# Patient Record
Sex: MALE | Race: WHITE | NOT HISPANIC OR LATINO | URBAN - METROPOLITAN AREA
[De-identification: names, ages, dates, MRNs, and addresses within clinical notes are randomized per-mention and may not be internally consistent; named-entity substitution may affect disease eponyms.]

---

## 2017-01-09 ENCOUNTER — EMERGENCY (EMERGENCY)
Facility: HOSPITAL | Age: 82
LOS: 1 days | Discharge: HOME | End: 2017-01-09
Attending: EMERGENCY MEDICINE | Admitting: EMERGENCY MEDICINE
Payer: MEDICARE

## 2017-01-09 VITALS
OXYGEN SATURATION: 99 % | DIASTOLIC BLOOD PRESSURE: 70 MMHG | RESPIRATION RATE: 18 BRPM | HEART RATE: 100 BPM | TEMPERATURE: 98 F | SYSTOLIC BLOOD PRESSURE: 152 MMHG

## 2017-01-09 PROCEDURE — 70486 CT MAXILLOFACIAL W/O DYE: CPT | Mod: 26

## 2017-01-09 PROCEDURE — 99284 EMERGENCY DEPT VISIT MOD MDM: CPT

## 2017-01-09 PROCEDURE — 70450 CT HEAD/BRAIN W/O DYE: CPT | Mod: 26

## 2017-01-09 NOTE — ED PROVIDER NOTE - OBJECTIVE STATEMENT
MD Riley:  89 yo M, c/o R facial laceration s/p mechanical fall/blunt facial trauma.  Patient was walking down stairs to his house, missed a step, hit R zygoma on bannister, sustaining laceration to this area and R temple.  Patient did not LOC, takes baby asa every other day.  Onset 2hrs ago.  No weakness/numbness, no HA, no blurry vision/double vision, no neck pain. Tdap up to date.   +two facial lacerations:  #1 R temple ~ 2cm, #2 R zygoma with muscle bone nerve exposure.

## 2017-01-09 NOTE — ED PROVIDER NOTE - MEDICAL DECISION MAKING DETAILS
MD Riley:  89 yo M, c/o R facial laceration s/p mechanical fall/blunt facial trauma.  Patient was walking down stairs to his house, missed a step, hit R zygoma on bannister, sustaining laceration to this area and R temple.  Patient did not LOC, takes baby asa every other day.  Onset 2hrs ago.  No weakness/numbness, no HA, no blurry vision/double vision, no neck pain.  VS - mild tachy.  Physical Exam: elderly M, ambulates w/o difficulty, +pleasant demeanor, smiling.  +two facial lacerations:  #1 R temple ~ 2cm, #2 R zygoma with muscle bone nerve exposure.  PERRL, EOMI, neck supple, no midline TTP.  RRR, CTAB, ambulates w/o difficulty, AAOx3.  Impression:  mechanical fall, Facial contusion with complex facial laceration, no LOC.  Given age and extent of facial trauma:  will CT Head/Max/Face.  Plastics to close laceration.

## 2017-01-09 NOTE — ED PROVIDER NOTE - ATTENDING CONTRIBUTION TO CARE
MD Riley:  patient seen and evaluated with the resident.  I was present for key portions of the History & Physical, and I agree with the Impression & Plan.  MD Riley:  89 yo M, c/o R facial laceration s/p mechanical fall/blunt facial trauma.  Patient was walking down stairs to his house, missed a step, hit R zygoma on bannister, sustaining laceration to this area and R temple.  Patient did not LOC, takes baby asa every other day.  Onset 2hrs ago.  No weakness/numbness, no HA, no blurry vision/double vision, no neck pain.  VS - mild tachy.  Physical Exam: elderly M, ambulates w/o difficulty, +pleasant demeanor, smiling.  +two facial lacerations:  #1 R temple ~ 2cm, #2 R zygoma with muscle bone nerve exposure.  PERRL, EOMI, neck supple, no midline TTP.  RRR, CTAB, ambulates w/o difficulty, AAOx3.  Impression:  mechanical fall, Facial contusion with complex facial laceration, no LOC.  Given age and extent of facial trauma:  will CT Head/Max/Face.  Plastics to close laceration.

## 2017-01-09 NOTE — ED PROVIDER NOTE - PROGRESS NOTE DETAILS
DAYO WEISS: Pt seen by plastics dr. archer, will f/u in his office  results of ct scan  discussed with him, he will f/u with his pmd

## 2017-01-09 NOTE — ED ADULT TRIAGE NOTE - CHIEF COMPLAINT QUOTE
Pt states missed a step hitting the side of his head. Pt denies LOC, states only on daily asa. Pt with laceration. Pt denies any headaches at this time or any dizzy now or prior to his injury.

## 2017-01-09 NOTE — ED PROVIDER NOTE - CONSTITUTIONAL, MLM
normal... Well appearing, well nourished, awake, alert, oriented to person, place, time/situation and in no apparent distress.  + two facial lacerations

## 2017-12-07 ENCOUNTER — EMERGENCY (EMERGENCY)
Facility: HOSPITAL | Age: 82
LOS: 1 days | Discharge: HOME | End: 2017-12-07
Attending: EMERGENCY MEDICINE | Admitting: EMERGENCY MEDICINE
Payer: MEDICARE

## 2017-12-07 VITALS
TEMPERATURE: 99 F | SYSTOLIC BLOOD PRESSURE: 175 MMHG | DIASTOLIC BLOOD PRESSURE: 84 MMHG | OXYGEN SATURATION: 95 % | HEART RATE: 75 BPM | RESPIRATION RATE: 20 BRPM

## 2017-12-07 VITALS
DIASTOLIC BLOOD PRESSURE: 70 MMHG | HEART RATE: 75 BPM | RESPIRATION RATE: 19 BRPM | TEMPERATURE: 98 F | OXYGEN SATURATION: 99 % | SYSTOLIC BLOOD PRESSURE: 123 MMHG

## 2017-12-07 DIAGNOSIS — R31.0 GROSS HEMATURIA: ICD-10-CM

## 2017-12-07 LAB
ALBUMIN SERPL ELPH-MCNC: 3.8 G/DL — SIGNIFICANT CHANGE UP (ref 3.3–5)
ALP SERPL-CCNC: 80 U/L — SIGNIFICANT CHANGE UP (ref 40–120)
ALT FLD-CCNC: 12 U/L RC — SIGNIFICANT CHANGE UP (ref 10–45)
ANION GAP SERPL CALC-SCNC: 13 MMOL/L — SIGNIFICANT CHANGE UP (ref 5–17)
APPEARANCE UR: ABNORMAL
AST SERPL-CCNC: 16 U/L — SIGNIFICANT CHANGE UP (ref 10–40)
BASOPHILS # BLD AUTO: 0.1 K/UL — SIGNIFICANT CHANGE UP (ref 0–0.2)
BASOPHILS NFR BLD AUTO: 0.6 % — SIGNIFICANT CHANGE UP (ref 0–2)
BILIRUB SERPL-MCNC: 0.4 MG/DL — SIGNIFICANT CHANGE UP (ref 0.2–1.2)
BILIRUB UR-MCNC: NEGATIVE — SIGNIFICANT CHANGE UP
BUN SERPL-MCNC: 28 MG/DL — HIGH (ref 7–23)
CALCIUM SERPL-MCNC: 8.8 MG/DL — SIGNIFICANT CHANGE UP (ref 8.4–10.5)
CHLORIDE SERPL-SCNC: 97 MMOL/L — SIGNIFICANT CHANGE UP (ref 96–108)
CO2 SERPL-SCNC: 21 MMOL/L — LOW (ref 22–31)
COLOR SPEC: ABNORMAL
COMMENT - URINE: SIGNIFICANT CHANGE UP
CREAT SERPL-MCNC: 1.2 MG/DL — SIGNIFICANT CHANGE UP (ref 0.5–1.3)
DIFF PNL FLD: ABNORMAL
EOSINOPHIL # BLD AUTO: 0.4 K/UL — SIGNIFICANT CHANGE UP (ref 0–0.5)
EOSINOPHIL NFR BLD AUTO: 4 % — SIGNIFICANT CHANGE UP (ref 0–6)
GLUCOSE SERPL-MCNC: 129 MG/DL — HIGH (ref 70–99)
GLUCOSE UR QL: NEGATIVE — SIGNIFICANT CHANGE UP
HCT VFR BLD CALC: 35.1 % — LOW (ref 39–50)
HGB BLD-MCNC: 12.6 G/DL — LOW (ref 13–17)
KETONES UR-MCNC: ABNORMAL
LEUKOCYTE ESTERASE UR-ACNC: ABNORMAL
LYMPHOCYTES # BLD AUTO: 3 K/UL — SIGNIFICANT CHANGE UP (ref 1–3.3)
LYMPHOCYTES # BLD AUTO: 31.4 % — SIGNIFICANT CHANGE UP (ref 13–44)
MCHC RBC-ENTMCNC: 33 PG — SIGNIFICANT CHANGE UP (ref 27–34)
MCHC RBC-ENTMCNC: 35.8 GM/DL — SIGNIFICANT CHANGE UP (ref 32–36)
MCV RBC AUTO: 92.3 FL — SIGNIFICANT CHANGE UP (ref 80–100)
MONOCYTES # BLD AUTO: 0.7 K/UL — SIGNIFICANT CHANGE UP (ref 0–0.9)
MONOCYTES NFR BLD AUTO: 7.6 % — SIGNIFICANT CHANGE UP (ref 2–14)
NEUTROPHILS # BLD AUTO: 5.4 K/UL — SIGNIFICANT CHANGE UP (ref 1.8–7.4)
NEUTROPHILS NFR BLD AUTO: 56.5 % — SIGNIFICANT CHANGE UP (ref 43–77)
NITRITE UR-MCNC: NEGATIVE — SIGNIFICANT CHANGE UP
PH UR: 6.5 — SIGNIFICANT CHANGE UP (ref 5–8)
PLATELET # BLD AUTO: 258 K/UL — SIGNIFICANT CHANGE UP (ref 150–400)
POTASSIUM SERPL-MCNC: 4.6 MMOL/L — SIGNIFICANT CHANGE UP (ref 3.5–5.3)
POTASSIUM SERPL-SCNC: 4.6 MMOL/L — SIGNIFICANT CHANGE UP (ref 3.5–5.3)
PROT SERPL-MCNC: 7.2 G/DL — SIGNIFICANT CHANGE UP (ref 6–8.3)
PROT UR-MCNC: 150 MG/DL
RBC # BLD: 3.8 M/UL — LOW (ref 4.2–5.8)
RBC # FLD: 11.2 % — SIGNIFICANT CHANGE UP (ref 10.3–14.5)
RBC CASTS # UR COMP ASSIST: ABNORMAL /HPF (ref 0–2)
SODIUM SERPL-SCNC: 131 MMOL/L — LOW (ref 135–145)
SP GR SPEC: 1.01 — LOW (ref 1.01–1.02)
UROBILINOGEN FLD QL: NEGATIVE — SIGNIFICANT CHANGE UP
WBC # BLD: 9.6 K/UL — SIGNIFICANT CHANGE UP (ref 3.8–10.5)
WBC # FLD AUTO: 9.6 K/UL — SIGNIFICANT CHANGE UP (ref 3.8–10.5)
WBC UR QL: SIGNIFICANT CHANGE UP /HPF (ref 0–5)

## 2017-12-07 PROCEDURE — 51798 US URINE CAPACITY MEASURE: CPT

## 2017-12-07 PROCEDURE — 99284 EMERGENCY DEPT VISIT MOD MDM: CPT | Mod: 25

## 2017-12-07 PROCEDURE — 51702 INSERT TEMP BLADDER CATH: CPT

## 2017-12-07 PROCEDURE — 87086 URINE CULTURE/COLONY COUNT: CPT

## 2017-12-07 PROCEDURE — 80053 COMPREHEN METABOLIC PANEL: CPT

## 2017-12-07 PROCEDURE — 85027 COMPLETE CBC AUTOMATED: CPT

## 2017-12-07 PROCEDURE — 81001 URINALYSIS AUTO W/SCOPE: CPT

## 2017-12-07 PROCEDURE — 99284 EMERGENCY DEPT VISIT MOD MDM: CPT | Mod: GC

## 2017-12-07 RX ORDER — CIPROFLOXACIN LACTATE 400MG/40ML
500 VIAL (ML) INTRAVENOUS EVERY 12 HOURS
Qty: 0 | Refills: 0 | Status: DISCONTINUED | OUTPATIENT
Start: 2017-12-07 | End: 2017-12-11

## 2017-12-07 RX ORDER — MOXIFLOXACIN HYDROCHLORIDE TABLETS, 400 MG 400 MG/1
1 TABLET, FILM COATED ORAL
Qty: 14 | Refills: 0
Start: 2017-12-07 | End: 2017-12-14

## 2017-12-07 RX ADMIN — Medication 500 MILLIGRAM(S): at 04:10

## 2017-12-07 NOTE — ED PROVIDER NOTE - OBJECTIVE STATEMENT
89yoM with PMH of htn, hypothyroidism, enlarged prostate p/w inability to pass urine. Pt endorsed 3 days of hematuria. Pt on Monday 89yoM with PMH of htn, hypothyroidism, enlarged prostate p/w inability to pass urine. Pt endorsed 3 days of hematuria. Pt on Monday had cystoscopy for hematuria by Dr. Downey, because of hx of bladder polyps 8 years ago. After cystoscopy, bleeding decreased. Pt was unable to pass urine today and had significant discomfort, thus called EMS. Pt was able to urinate in ED and has passed hematuria, lighter than previous. Denies pain with urinating.

## 2017-12-07 NOTE — ED PROVIDER NOTE - NS ED ROS FT
ROS  Gen:  no fevers, no chills, no weight loss  HEENT: no vision changes, no hearing loss, no pharygnitis, no oral lesions  Pulm: no cough, no SOB, no sputum production, no wheezing  Cardiac: no chest pain, no SOB, no orthopnea  GI: no abdominal pain, no N, no V, no diarrhea, no constipation  :  + hematuria, inability to pass urine prior to arriving to ED  Skin: no skin lesions  Neuro:  no headache, no focal weakness, no numbness

## 2017-12-07 NOTE — ED PROVIDER NOTE - PLAN OF CARE
You were seen in the emergency department for inability to pass urine. While you were in the ED, you were able to pass bloody urine. A bedside ultrasound examination of the bladder showed that you still had significant amount of urine retained in the bladder as well as blood clots. The urology team was consulted. They performed bladder irrigation to help clear out the blood clots. Please follow up with Dr. Downey within 24-48 hours of discharge from the emergency department. If you experience inability to urine, pelvic pain, fevers, chills, or worsening of blood loss in your urine, please return to the emergency department for further evaluation.

## 2017-12-07 NOTE — ED ADULT NURSE NOTE - OBJECTIVE STATEMENT
89y male presents to the ER c/o urinary retention. pt is a&o x4 and speaking coherently. pt states he had a cystectomy on Monday from his bladder. Pt states around 11pm last night 12/6 he noticed decreased urinary output, pt states when he woke up around 12 he had the urge to urinate, but was unable to produce urine with straining. pt states in the ER he was able to void a large amount of urine. 300 cc shown with bedside US, pt voided after ultrasound as well. Pt in  NAD. denies urge to urinate. pt appears calm. will reassess.

## 2017-12-07 NOTE — ED PROVIDER NOTE - PROGRESS NOTE DETAILS
Attending Shaji: pt voided approximately 400 cc urine remained. gross blood present. will call urology Attending Shaji: pt seen by urology who irrigated. urine clear, pt to be discharged with molina. recommend starting cipro

## 2017-12-07 NOTE — ED PROVIDER NOTE - MEDICAL DECISION MAKING DETAILS
89yo M p/w inability to pass urine, now passing urine, with persistent hematuria. PVR on bladder scan showed 300cc and clots. Pt feels urge to urinate again.   - will repeat PVR, may require CBI 89yo M p/w inability to pass urine, now passing urine, with persistent hematuria. PVR on bladder scan showed 300cc and clots. Pt feels urge to urinate again.   - will repeat PVR, may require CBI  Attending Shaji: 89 y/o male s/p recent cystoscopy with dr oden presenting with difficulty urinating. pocus shows approximately 350cc after urination with evidence of clot. pt given ivf. d/w urology who irrigated and placed molina. recommend d/c on abx. close follw up with dr oden

## 2017-12-07 NOTE — ED PROVIDER NOTE - CARE PLAN
Principal Discharge DX:	Hematuria, gross  Instructions for follow-up, activity and diet:	You were seen in the emergency department for inability to pass urine. While you were in the ED, you were able to pass bloody urine. A bedside ultrasound examination of the bladder showed that you still had significant amount of urine retained in the bladder as well as blood clots. The urology team was consulted. They performed bladder irrigation to help clear out the blood clots. Please follow up with Dr. Downey within 24-48 hours of discharge from the emergency department. If you experience inability to urine, pelvic pain, fevers, chills, or worsening of blood loss in your urine, please return to the emergency department for further evaluation.

## 2017-12-07 NOTE — CONSULT NOTE ADULT - SUBJECTIVE AND OBJECTIVE BOX
89yoM with PMH of HTN, hypothyroidism, enlarged prostate p/w inability to pass urine. Pt endorsed 3 days of hematuria. Patient on Monday had cystoscopy for hematuria with Dr. Downey, because of history of bladder polyps 8 years ago. After cystoscopy, bleeding decreased. Patient was unable to pass urine and had significant discomfort, thus called EMS. Patient came into the ED with clot retention, patient states he voided clots. Patient denies dysuria, frequency, urgency, abdominal pain.     PAST MEDICAL & SURGICAL HISTORY:  Hypothyroidism  Enlarged prostate  Hypertension  No significant past surgical history      MEDICATIONS  (STANDING):  ciprofloxacin     Tablet 500 milliGRAM(s) Oral every 12 hours    MEDICATIONS  (PRN):      FAMILY HISTORY:  No pertinent family history in first degree relatives      Allergies    No Known Allergies    Intolerances        SOCIAL HISTORY:    REVIEW OF SYSTEMS: Otherwise negative as stated in HPI    Physical Exam  Vital signs  T(C): 36.6 (17 @ 03:52), Max: 37 (17 @ 01:13)  HR: 68 (17 @ 03:52)  BP: 122/87 (17 @ 03:52)  SpO2: 97% (17 @ 03:52)  Wt(kg): --    Output  I&O's Summary    UOP    Gen:  [x] NAD    Pulm:  [x] no resp distress [x] no substernal retractions  	    GI:  [x] Soft [x] ND [x] NT    :  Glans Circumcised [x]Y  []N  Meatus Discharge []Y  [x] N,  Blood []Y [x] N                        	  MSK:  Edema []Y [x]N    LABS:       @ 02:19    WBC 9.6   / Hct 35.1  / SCr 1.20         131<L>  |  97  |  28<H>  ----------------------------<  129<H>  4.6   |  21<L>  |  1.20    Ca    8.8      07 Dec 2017 02:19    TPro  7.2  /  Alb  3.8  /  TBili  0.4  /  DBili  x   /  AST  16  /  ALT  12  /  AlkPhos  80        Urinalysis Basic - ( 07 Dec 2017 02:19 )    Color: Red / Appearance: SL Turbid / S.006 / pH: x  Gluc: x / Ketone: Trace  / Bili: Negative / Urobili: Negative   Blood: x / Protein: 150 mg/dL / Nitrite: Negative   Leuk Esterase: Small / RBC: 25-50 /HPF / WBC 11-25 /HPF   Sq Epi: x / Non Sq Epi: x / Bacteria: x        Urine Cx: Pending

## 2017-12-07 NOTE — CONSULT NOTE ADULT - ASSESSMENT
89yoM with PMH of HTN, hypothyroidism, enlarged prostate presents with inability to pass urine.    Patient irrigated with 16 F molina using 500 cc NS with no clots visualized. 14 coude F molina placed using sterile technique. 400cc translucent red urine drained. Pt tolerated procedure well. Patient will be discharged with molina catheter home.     -DC patient with antibiotics   -DC patient with molina leg bag   -Follow up with Dr. Downey   -Discussed with Dr. Marie

## 2017-12-07 NOTE — CONSULT NOTE ADULT - PROBLEM SELECTOR RECOMMENDATION 9
-Irrigated patient  -Molina placed, DC home with molina catheter   -DC home with antibiotics   -Follow up with Dr. Downey

## 2017-12-08 LAB
CULTURE RESULTS: NO GROWTH — SIGNIFICANT CHANGE UP
SPECIMEN SOURCE: SIGNIFICANT CHANGE UP

## 2017-12-14 ENCOUNTER — APPOINTMENT (OUTPATIENT)
Dept: CT IMAGING | Facility: IMAGING CENTER | Age: 82
End: 2017-12-14
Payer: MEDICARE

## 2017-12-14 ENCOUNTER — OUTPATIENT (OUTPATIENT)
Dept: OUTPATIENT SERVICES | Facility: HOSPITAL | Age: 82
LOS: 1 days | End: 2017-12-14
Payer: MEDICARE

## 2017-12-14 DIAGNOSIS — R31.0 GROSS HEMATURIA: ICD-10-CM

## 2017-12-14 PROCEDURE — 74178 CT ABD&PLV WO CNTR FLWD CNTR: CPT

## 2017-12-14 PROCEDURE — 74178 CT ABD&PLV WO CNTR FLWD CNTR: CPT | Mod: 26

## 2019-01-19 ENCOUNTER — EMERGENCY (EMERGENCY)
Facility: HOSPITAL | Age: 84
LOS: 1 days | Discharge: HOME | End: 2019-01-19
Attending: EMERGENCY MEDICINE | Admitting: EMERGENCY MEDICINE
Payer: MEDICARE

## 2019-01-19 VITALS
HEART RATE: 88 BPM | OXYGEN SATURATION: 100 % | RESPIRATION RATE: 18 BRPM | DIASTOLIC BLOOD PRESSURE: 87 MMHG | TEMPERATURE: 98 F | SYSTOLIC BLOOD PRESSURE: 159 MMHG

## 2019-01-19 VITALS
DIASTOLIC BLOOD PRESSURE: 81 MMHG | RESPIRATION RATE: 20 BRPM | HEART RATE: 100 BPM | SYSTOLIC BLOOD PRESSURE: 190 MMHG | TEMPERATURE: 98 F | WEIGHT: 179.9 LBS | HEIGHT: 66 IN | OXYGEN SATURATION: 99 %

## 2019-01-19 LAB
ALBUMIN SERPL ELPH-MCNC: 3.9 G/DL — SIGNIFICANT CHANGE UP (ref 3.3–5)
ALP SERPL-CCNC: 77 U/L — SIGNIFICANT CHANGE UP (ref 40–120)
ALT FLD-CCNC: 33 U/L — SIGNIFICANT CHANGE UP (ref 10–45)
ANION GAP SERPL CALC-SCNC: 11 MMOL/L — SIGNIFICANT CHANGE UP (ref 5–17)
ANION GAP SERPL CALC-SCNC: 11 MMOL/L — SIGNIFICANT CHANGE UP (ref 5–17)
AST SERPL-CCNC: 43 U/L — HIGH (ref 10–40)
BASOPHILS # BLD AUTO: 0 K/UL — SIGNIFICANT CHANGE UP (ref 0–0.2)
BASOPHILS NFR BLD AUTO: 0.3 % — SIGNIFICANT CHANGE UP (ref 0–2)
BILIRUB SERPL-MCNC: 0.2 MG/DL — SIGNIFICANT CHANGE UP (ref 0.2–1.2)
BUN SERPL-MCNC: 46 MG/DL — HIGH (ref 7–23)
BUN SERPL-MCNC: 50 MG/DL — HIGH (ref 7–23)
CALCIUM SERPL-MCNC: 9 MG/DL — SIGNIFICANT CHANGE UP (ref 8.4–10.5)
CALCIUM SERPL-MCNC: 9.2 MG/DL — SIGNIFICANT CHANGE UP (ref 8.4–10.5)
CHLORIDE SERPL-SCNC: 105 MMOL/L — SIGNIFICANT CHANGE UP (ref 96–108)
CHLORIDE SERPL-SCNC: 107 MMOL/L — SIGNIFICANT CHANGE UP (ref 96–108)
CO2 SERPL-SCNC: 19 MMOL/L — LOW (ref 22–31)
CO2 SERPL-SCNC: 21 MMOL/L — LOW (ref 22–31)
CREAT SERPL-MCNC: 1.28 MG/DL — SIGNIFICANT CHANGE UP (ref 0.5–1.3)
CREAT SERPL-MCNC: 1.35 MG/DL — HIGH (ref 0.5–1.3)
EOSINOPHIL # BLD AUTO: 0.2 K/UL — SIGNIFICANT CHANGE UP (ref 0–0.5)
EOSINOPHIL NFR BLD AUTO: 2.7 % — SIGNIFICANT CHANGE UP (ref 0–6)
GLUCOSE SERPL-MCNC: 145 MG/DL — HIGH (ref 70–99)
GLUCOSE SERPL-MCNC: 176 MG/DL — HIGH (ref 70–99)
HCT VFR BLD CALC: 37 % — LOW (ref 39–50)
HGB BLD-MCNC: 12.7 G/DL — LOW (ref 13–17)
LYMPHOCYTES # BLD AUTO: 2.2 K/UL — SIGNIFICANT CHANGE UP (ref 1–3.3)
LYMPHOCYTES # BLD AUTO: 27.2 % — SIGNIFICANT CHANGE UP (ref 13–44)
MCHC RBC-ENTMCNC: 31 PG — SIGNIFICANT CHANGE UP (ref 27–34)
MCHC RBC-ENTMCNC: 34.3 GM/DL — SIGNIFICANT CHANGE UP (ref 32–36)
MCV RBC AUTO: 90.6 FL — SIGNIFICANT CHANGE UP (ref 80–100)
MONOCYTES # BLD AUTO: 0.8 K/UL — SIGNIFICANT CHANGE UP (ref 0–0.9)
MONOCYTES NFR BLD AUTO: 9.5 % — SIGNIFICANT CHANGE UP (ref 2–14)
NEUTROPHILS # BLD AUTO: 5 K/UL — SIGNIFICANT CHANGE UP (ref 1.8–7.4)
NEUTROPHILS NFR BLD AUTO: 60.2 % — SIGNIFICANT CHANGE UP (ref 43–77)
PLATELET # BLD AUTO: 266 K/UL — SIGNIFICANT CHANGE UP (ref 150–400)
POTASSIUM SERPL-MCNC: 4.8 MMOL/L — SIGNIFICANT CHANGE UP (ref 3.5–5.3)
POTASSIUM SERPL-MCNC: 6.6 MMOL/L — CRITICAL HIGH (ref 3.5–5.3)
POTASSIUM SERPL-SCNC: 4.8 MMOL/L — SIGNIFICANT CHANGE UP (ref 3.5–5.3)
POTASSIUM SERPL-SCNC: 6.6 MMOL/L — CRITICAL HIGH (ref 3.5–5.3)
PROT SERPL-MCNC: 8 G/DL — SIGNIFICANT CHANGE UP (ref 6–8.3)
RBC # BLD: 4.09 M/UL — LOW (ref 4.2–5.8)
RBC # FLD: 11.9 % — SIGNIFICANT CHANGE UP (ref 10.3–14.5)
SODIUM SERPL-SCNC: 135 MMOL/L — SIGNIFICANT CHANGE UP (ref 135–145)
SODIUM SERPL-SCNC: 139 MMOL/L — SIGNIFICANT CHANGE UP (ref 135–145)
WBC # BLD: 8.3 K/UL — SIGNIFICANT CHANGE UP (ref 3.8–10.5)
WBC # FLD AUTO: 8.3 K/UL — SIGNIFICANT CHANGE UP (ref 3.8–10.5)

## 2019-01-19 PROCEDURE — 93010 ELECTROCARDIOGRAM REPORT: CPT

## 2019-01-19 PROCEDURE — 85027 COMPLETE CBC AUTOMATED: CPT

## 2019-01-19 PROCEDURE — 93005 ELECTROCARDIOGRAM TRACING: CPT

## 2019-01-19 PROCEDURE — 36415 COLL VENOUS BLD VENIPUNCTURE: CPT

## 2019-01-19 PROCEDURE — 99283 EMERGENCY DEPT VISIT LOW MDM: CPT

## 2019-01-19 PROCEDURE — 99284 EMERGENCY DEPT VISIT MOD MDM: CPT | Mod: 25

## 2019-01-19 PROCEDURE — 80048 BASIC METABOLIC PNL TOTAL CA: CPT

## 2019-01-19 PROCEDURE — 80053 COMPREHEN METABOLIC PANEL: CPT

## 2019-01-19 NOTE — ED ADULT NURSE NOTE - OBJECTIVE STATEMENT
89 y/o M w/ pmh of HTN, hypothyroidism, enlarge prostates, present to ED for abnormal labs, pt went to PMD for route visit and lab work, pt was called to and told potassium was elevated and to go to ED immediately, on exam has no complaints, pt A&Ox3, breathing spontaneous, unlabored w/o distress on room air, NAD, denies dizziness, chest pain, SOB, hematuria, dysuria, fever, chills, N/V/D, seen and eval by MD, heplock placed, labs drawn and sent, EKG done and given to MD

## 2019-01-19 NOTE — ED ADULT NURSE NOTE - NSIMPLEMENTINTERV_GEN_ALL_ED
Implemented All Fall with Harm Risk Interventions:  [place] to call system. Call bell, personal items and telephone within reach. Instruct patient to call for assistance. Room bathroom lighting operational. Non-slip footwear when patient is off stretcher. Physically safe environment: no spills, clutter or unnecessary equipment. Stretcher in lowest position, wheels locked, appropriate side rails in place. Provide visual cue, wrist band, yellow gown, etc. Monitor gait and stability. Monitor for mental status changes and reorient to person, place, and time. Review medications for side effects contributing to fall risk. Reinforce activity limits and safety measures with patient and family. Provide visual clues: red socks.

## 2019-01-19 NOTE — ED PROVIDER NOTE - PROGRESS NOTE DETAILS
Pt re-evaluated s/p labs. Feels well. Results d/w and given to patient. To d/c with outpatient f/u. - Varun Fowler MD

## 2019-01-19 NOTE — ED PROVIDER NOTE - ATTENDING CONTRIBUTION TO CARE
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 Pt seeing cardiologist, Dr Tran, had routine labwork performed, found to have K of 6.8. Pt denies cp, palpitations, sob, lightheadedness, dizziness, n/v/d, abd pain, or any other complaints. Feels well.    PE: Elderly male in NAD, NCAT, MMM, Trachea midline, Normal conjunctiva, lungs CTAB, S1/S2 RRR, Normal perfusion, 2+ radial pulses bilat, Abdomen Soft, NTND, No rebound/guarding, No LE edema, No deformity of extremities, No rashes,  No focal motor or sensory deficits.     Pt appears well, VS reassuring. No sx, most likely is lab error. To repeat bloodwork. EKG shows RBBB which pt states is known. Re-eval, likely d/c if labs reassuring. - Varun Fowler MD

## 2019-01-19 NOTE — ED PROVIDER NOTE - CARE PLAN
Principal Discharge DX:	Abnormal laboratory test result  Assessment and plan of treatment:	Follow up with your Primary Care Physician within the next 2-3 days  Bring a copy of your test results with you to your appointment  Continue your current medication regimen  Return to the Emergency Room if you experience new or worsening symptoms

## 2019-01-19 NOTE — ED PROVIDER NOTE - OBJECTIVE STATEMENT
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 The lab result was reviewed by his cardiologist Dr Eulogio Tran who upon reviewing the labs instructed him to come to the ED. He has chronic EKG finding of known old right and left BBB. The patient denies chest pain, palpitations, tachycardia or any other symptoms.

## 2019-01-19 NOTE — ED ADULT NURSE NOTE - NS ED NURSE RECORD ANOTHER VITAL SIGN
PRE-SEDATION ASSESSMENT    CONSENT  Consent for procedure and sedation obtained: Yes    MEDICAL HISTORY  Significant medical/surgical history: No  Past Complications with Sedation/Anesthesia: No  Significant Family History: No  Smoking History: No  Alcohol/Drug abuse: No  Possible Pregnancy (LMP): No  Cardiac History: No  Respiratory History: No    PHYSICAL EXAM  History and Physical Reviewed: H&P completed today  Airway Risk History: No previous history  Airway Anatomy : Class I  Heart : Normal  Lungs : Normal  LOC/Mental Status : Normal    OTHER FINDINGS  Reviewed current medications and allergies: No  Pertinent lab/diagnostic test reviewed: No    SEDATION RISK ASSESSMENT  Risk Status ASA: Class I - Normal, healthy patient  Plan for Sedation: Moderate Sedation  Indications for Procedure/Pre-Procedure Diagnosis and Planned Procedure: recurrent oral sores, dysphagia  EKG Monitoring: Yes    NARRATIVE FINDINGS      Yes

## 2019-01-19 NOTE — ED ADULT NURSE REASSESSMENT NOTE - NS ED NURSE REASSESS COMMENT FT1
Patient report received from Gely SOTO. Patient is a/ox3, denies medical complaints. Well appearing, awaiting repeat labs at this time. VSS, afebrile.

## 2019-05-25 ENCOUNTER — EMERGENCY (EMERGENCY)
Facility: HOSPITAL | Age: 84
LOS: 1 days | Discharge: HOME | End: 2019-05-25
Attending: EMERGENCY MEDICINE
Payer: MEDICARE

## 2019-05-25 VITALS
TEMPERATURE: 98 F | DIASTOLIC BLOOD PRESSURE: 72 MMHG | HEART RATE: 75 BPM | OXYGEN SATURATION: 98 % | HEIGHT: 66 IN | RESPIRATION RATE: 16 BRPM | SYSTOLIC BLOOD PRESSURE: 156 MMHG | WEIGHT: 177.91 LBS

## 2019-05-25 PROCEDURE — 73020 X-RAY EXAM OF SHOULDER: CPT

## 2019-05-25 PROCEDURE — 76882 US LMTD JT/FCL EVL NVASC XTR: CPT

## 2019-05-25 PROCEDURE — 99284 EMERGENCY DEPT VISIT MOD MDM: CPT | Mod: 25

## 2019-05-25 PROCEDURE — 99284 EMERGENCY DEPT VISIT MOD MDM: CPT

## 2019-05-25 PROCEDURE — 73030 X-RAY EXAM OF SHOULDER: CPT | Mod: 26,RT

## 2019-05-25 PROCEDURE — 76882 US LMTD JT/FCL EVL NVASC XTR: CPT | Mod: 26

## 2019-05-25 PROCEDURE — 73030 X-RAY EXAM OF SHOULDER: CPT

## 2019-05-25 NOTE — ED PROVIDER NOTE - CARE PLAN
Principal Discharge DX:	Shoulder pain, right  Assessment and plan of treatment:	Apply ice 20mins on, 40mins off in cycle for first 24-48 hours. Then apply heat.  Keep shoulder immobilizer in place for comfort until follow up with orthopedic.   Take Tylenol 650mg every 6-8 hours as needed for pain.   Follow up with your Primary Care Provider upon discharge and Orthopedic at upcoming appointment for further evaluation and treatment.   Return to ED for worsening pain, fever, weakness, numbness or any other concerning symptoms.

## 2019-05-25 NOTE — ED ADULT NURSE NOTE - NSIMPLEMENTINTERV_GEN_ALL_ED
Implemented All Fall with Harm Risk Interventions:  [place] to call system. Call bell, personal items and telephone within reach. Instruct patient to call for assistance. Room bathroom lighting operational. Non-slip footwear when patient is off stretcher. Physically safe environment: no spills, clutter or unnecessary equipment. Stretcher in lowest position, wheels locked, appropriate side rails in place. Provide visual cue, wrist band, yellow gown, etc. Monitor gait and stability. Monitor for mental status changes and reorient to person, place, and time. Review medications for side effects contributing to fall risk. Reinforce activity limits and safety measures with patient and family. Provide visual clues: red socks.

## 2019-05-25 NOTE — ED PROVIDER NOTE - PHYSICAL EXAMINATION
Attending Lou Girard: gen; nad, heent; atraumatic, eomi, perrla, cv: rrr, lungs: ctab, abd: soft, nontender, nondistneded. ext: rightshoulder edema +effusion, no deformty, neuro: awake and alert following commands

## 2019-05-25 NOTE — ED PROVIDER NOTE - UPPER EXTREMITY EXAM, RIGHT
+ mild ttp at lateral deltoid, ROM intact + pain only with abduction; full elbow/wrist/hand ROM intact with strength 5/5; radial pulse 2+; sensation intact

## 2019-05-25 NOTE — ED PROVIDER NOTE - NSFOLLOWUPCLINICS_GEN_ALL_ED_FT
[facility] Orthopedic [facility]  Orthopedic Surgery  [address], [address]rd [address]th [place] [address]  Phone: [phone]  Fax:   Follow Up Time: 1-3 Days

## 2019-05-25 NOTE — ED PROVIDER NOTE - CLINICAL SUMMARY MEDICAL DECISION MAKING FREE TEXT BOX
Attending Lou Girard: 89 y/o male presenting with right shoulder pain. no deformity on exam or neurovascular compromise. xray shows no evidence of deformity. suspect likely ligamentous injury. pt has orthopedic follow up. did not want tylenol. plan to d/c

## 2019-05-25 NOTE — ED ADULT NURSE NOTE - OBJECTIVE STATEMENT
Pt is a 89 yo M who came to the ED amb c/o rt shoulder injury. States he was reaching for something and felt a "pop" in rt shoulder this morning. A/O x3, + swelling rt shoulder, no obvious deformity, + pulses, dec ROM due to injury.

## 2019-05-25 NOTE — ED PROVIDER NOTE - PLAN OF CARE
Apply ice 20mins on, 40mins off in cycle for first 24-48 hours. Then apply heat.  Keep shoulder immobilizer in place for comfort until follow up with orthopedic.   Take Tylenol 650mg every 6-8 hours as needed for pain.   Follow up with your Primary Care Provider upon discharge and Orthopedic at upcoming appointment for further evaluation and treatment.   Return to ED for worsening pain, fever, weakness, numbness or any other concerning symptoms.

## 2019-05-25 NOTE — ED PROVIDER NOTE - OBJECTIVE STATEMENT
89yo M presenting with R shoulder pain since last night. Pt reports he felt a pop while reaching up into a cabinet and has since had R shoulder pain with movement. No pain at rest. Does not want anything for pain at this time. No fall/trauma. No previous orthopedic surgeries on neck/RUE. Denies CP, SOB, neck pain, R elbow/wrist/hand pain, numbness/tingling. 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 Pt reports he felt a pop while reaching up into a cabinet and has since had R shoulder pain with movement. No pain at rest. Does not want anything for pain at this time. No fall/trauma. No previous orthopedic surgeries on neck/RUE. Denies CP, SOB, neck pain, R elbow/wrist/hand pain, numbness/tingling.

## 2019-05-25 NOTE — ED PROVIDER NOTE - ATTENDING CONTRIBUTION TO CARE
Attending MD Lou Girard:   I personally have seen and examined this patient.  Physician assistant note reviewed and agree on plan of care and except where noted.  See HPI, PE, and MDM for details.

## 2019-05-29 ENCOUNTER — APPOINTMENT (OUTPATIENT)
Dept: ORTHOPEDIC SURGERY | Facility: CLINIC | Age: 84
End: 2019-05-29
Payer: MEDICARE

## 2019-05-29 VITALS — HEART RATE: 71 BPM | DIASTOLIC BLOOD PRESSURE: 68 MMHG | SYSTOLIC BLOOD PRESSURE: 137 MMHG

## 2019-05-29 VITALS — HEIGHT: 66 IN | BODY MASS INDEX: 28.93 KG/M2 | WEIGHT: 180 LBS

## 2019-05-29 PROCEDURE — 99203 OFFICE O/P NEW LOW 30 MIN: CPT

## 2019-05-29 RX ORDER — CHROMIUM 200 MCG
TABLET ORAL
Refills: 0 | Status: ACTIVE | COMMUNITY

## 2019-07-03 ENCOUNTER — APPOINTMENT (OUTPATIENT)
Dept: ORTHOPEDIC SURGERY | Facility: CLINIC | Age: 84
End: 2019-07-03

## 2019-07-10 ENCOUNTER — APPOINTMENT (OUTPATIENT)
Dept: ORTHOPEDIC SURGERY | Facility: CLINIC | Age: 84
End: 2019-07-10
Payer: MEDICARE

## 2019-07-10 VITALS
SYSTOLIC BLOOD PRESSURE: 136 MMHG | HEIGHT: 66 IN | WEIGHT: 174 LBS | HEART RATE: 63 BPM | BODY MASS INDEX: 27.97 KG/M2 | DIASTOLIC BLOOD PRESSURE: 64 MMHG

## 2019-07-10 DIAGNOSIS — M19.211 SECONDARY OSTEOARTHRITIS, RIGHT SHOULDER: ICD-10-CM

## 2019-07-10 DIAGNOSIS — M75.122 COMPLETE ROTATOR CUFF TEAR OR RUPTURE OF LEFT SHOULDER, NOT SPECIFIED AS TRAUMATIC: ICD-10-CM

## 2019-07-10 DIAGNOSIS — M75.121 COMPLETE ROTATOR CUFF TEAR OR RUPTURE OF RIGHT SHOULDER, NOT SPECIFIED AS TRAUMATIC: ICD-10-CM

## 2019-07-10 PROCEDURE — 99212 OFFICE O/P EST SF 10 MIN: CPT

## 2019-07-16 PROBLEM — D03.9 MELANOMA IN SITU: Status: ACTIVE | Noted: 2019-07-16

## 2019-07-17 ENCOUNTER — APPOINTMENT (OUTPATIENT)
Dept: SURGICAL ONCOLOGY | Facility: CLINIC | Age: 84
End: 2019-07-17
Payer: MEDICARE

## 2019-07-17 VITALS
RESPIRATION RATE: 16 BRPM | HEART RATE: 60 BPM | DIASTOLIC BLOOD PRESSURE: 73 MMHG | WEIGHT: 172 LBS | SYSTOLIC BLOOD PRESSURE: 173 MMHG | HEIGHT: 66 IN | OXYGEN SATURATION: 96 % | BODY MASS INDEX: 27.64 KG/M2 | TEMPERATURE: 97.8 F

## 2019-07-17 DIAGNOSIS — D03.9 MELANOMA IN SITU, UNSPECIFIED: ICD-10-CM

## 2019-07-17 PROCEDURE — 99204 OFFICE O/P NEW MOD 45 MIN: CPT

## 2019-07-24 ENCOUNTER — OUTPATIENT (OUTPATIENT)
Dept: OUTPATIENT SERVICES | Facility: HOSPITAL | Age: 84
LOS: 1 days | End: 2019-07-24
Payer: MEDICARE

## 2019-07-24 ENCOUNTER — EMERGENCY (EMERGENCY)
Facility: HOSPITAL | Age: 84
LOS: 1 days | Discharge: HOME | End: 2019-07-24
Attending: EMERGENCY MEDICINE
Payer: MEDICARE

## 2019-07-24 ENCOUNTER — RESULT REVIEW (OUTPATIENT)
Age: 84
End: 2019-07-24

## 2019-07-24 VITALS
DIASTOLIC BLOOD PRESSURE: 80 MMHG | SYSTOLIC BLOOD PRESSURE: 164 MMHG | TEMPERATURE: 98 F | OXYGEN SATURATION: 97 % | HEART RATE: 77 BPM | RESPIRATION RATE: 18 BRPM

## 2019-07-24 VITALS
DIASTOLIC BLOOD PRESSURE: 66 MMHG | TEMPERATURE: 98 F | RESPIRATION RATE: 18 BRPM | OXYGEN SATURATION: 97 % | HEIGHT: 66 IN | SYSTOLIC BLOOD PRESSURE: 135 MMHG | HEART RATE: 61 BPM | WEIGHT: 171.96 LBS

## 2019-07-24 DIAGNOSIS — D03.4 MELANOMA IN SITU OF SCALP AND NECK: ICD-10-CM

## 2019-07-24 LAB
ANION GAP SERPL CALC-SCNC: 13 MMOL/L — SIGNIFICANT CHANGE UP (ref 5–17)
BUN SERPL-MCNC: 36 MG/DL — HIGH (ref 7–23)
CALCIUM SERPL-MCNC: 9.3 MG/DL — SIGNIFICANT CHANGE UP (ref 8.4–10.5)
CHLORIDE SERPL-SCNC: 106 MMOL/L — SIGNIFICANT CHANGE UP (ref 96–108)
CO2 SERPL-SCNC: 20 MMOL/L — LOW (ref 22–31)
CREAT SERPL-MCNC: 1.32 MG/DL — HIGH (ref 0.5–1.3)
GLUCOSE SERPL-MCNC: 133 MG/DL — HIGH (ref 70–99)
HCT VFR BLD CALC: 38.5 % — LOW (ref 39–50)
HGB BLD-MCNC: 12.7 G/DL — LOW (ref 13–17)
INR BLD: 1 RATIO — SIGNIFICANT CHANGE UP (ref 0.88–1.16)
MCHC RBC-ENTMCNC: 30.6 PG — SIGNIFICANT CHANGE UP (ref 27–34)
MCHC RBC-ENTMCNC: 33 GM/DL — SIGNIFICANT CHANGE UP (ref 32–36)
MCV RBC AUTO: 92.8 FL — SIGNIFICANT CHANGE UP (ref 80–100)
PLATELET # BLD AUTO: 246 K/UL — SIGNIFICANT CHANGE UP (ref 150–400)
POTASSIUM SERPL-MCNC: 4.9 MMOL/L — SIGNIFICANT CHANGE UP (ref 3.5–5.3)
POTASSIUM SERPL-MCNC: 5.8 MMOL/L — HIGH (ref 3.5–5.3)
POTASSIUM SERPL-SCNC: 4.9 MMOL/L — SIGNIFICANT CHANGE UP (ref 3.5–5.3)
POTASSIUM SERPL-SCNC: 5.8 MMOL/L — HIGH (ref 3.5–5.3)
PROTHROM AB SERPL-ACNC: 11.5 SEC — SIGNIFICANT CHANGE UP (ref 10–12.9)
RBC # BLD: 4.15 M/UL — LOW (ref 4.2–5.8)
RBC # FLD: 11.5 % — SIGNIFICANT CHANGE UP (ref 10.3–14.5)
SODIUM SERPL-SCNC: 139 MMOL/L — SIGNIFICANT CHANGE UP (ref 135–145)
WBC # BLD: 9.1 K/UL — SIGNIFICANT CHANGE UP (ref 3.8–10.5)
WBC # FLD AUTO: 9.1 K/UL — SIGNIFICANT CHANGE UP (ref 3.8–10.5)

## 2019-07-24 PROCEDURE — 99284 EMERGENCY DEPT VISIT MOD MDM: CPT

## 2019-07-24 PROCEDURE — 74177 CT ABD & PELVIS W/CONTRAST: CPT

## 2019-07-24 PROCEDURE — 71260 CT THORAX DX C+: CPT

## 2019-07-24 PROCEDURE — 84132 ASSAY OF SERUM POTASSIUM: CPT

## 2019-07-24 PROCEDURE — 88321 CONSLTJ&REPRT SLD PREP ELSWR: CPT

## 2019-07-24 PROCEDURE — 99284 EMERGENCY DEPT VISIT MOD MDM: CPT | Mod: GC

## 2019-07-24 PROCEDURE — 80048 BASIC METABOLIC PNL TOTAL CA: CPT

## 2019-07-24 PROCEDURE — 85027 COMPLETE CBC AUTOMATED: CPT

## 2019-07-24 PROCEDURE — 85610 PROTHROMBIN TIME: CPT

## 2019-07-24 PROCEDURE — 71260 CT THORAX DX C+: CPT | Mod: 26

## 2019-07-24 RX ORDER — SODIUM CHLORIDE 9 MG/ML
500 INJECTION INTRAMUSCULAR; INTRAVENOUS; SUBCUTANEOUS ONCE
Refills: 0 | Status: COMPLETED | OUTPATIENT
Start: 2019-07-24 | End: 2019-07-24

## 2019-07-24 RX ORDER — SODIUM CHLORIDE 9 MG/ML
500 INJECTION, SOLUTION INTRAVENOUS
Refills: 0 | Status: DISCONTINUED | OUTPATIENT
Start: 2019-07-24 | End: 2019-07-24

## 2019-07-24 RX ORDER — ACETAMINOPHEN 500 MG
650 TABLET ORAL ONCE
Refills: 0 | Status: COMPLETED | OUTPATIENT
Start: 2019-07-24 | End: 2019-07-24

## 2019-07-24 RX ADMIN — SODIUM CHLORIDE 500 MILLILITER(S): 9 INJECTION INTRAMUSCULAR; INTRAVENOUS; SUBCUTANEOUS at 17:02

## 2019-07-24 NOTE — ED ADULT NURSE REASSESSMENT NOTE - NS ED NURSE REASSESS COMMENT FT1
Que HARDING call radiology for preliminary CT read. Pt aware results only preliminary and not final, preliminary read clear. Pt discharged home to follow up with PMD. VSS. Pt ambulate to discharge area with steady gait.

## 2019-07-24 NOTE — ED PROVIDER NOTE - CARDIAC, MLM
Normal rate, regular rhythm.  Heart sounds S1, S2.  No murmurs, rubs or gallops. With palpation of the curve of the 12th rib on the left side, patient with marked point tenderness. He also has some LUQ pain.

## 2019-07-24 NOTE — ED PROVIDER NOTE - PLAN OF CARE
Patient presenting with Patient presenting with left rib pain after fall.     Left chest wall pain  - CT chest, abd, pelvis pending.  - Pain is well controlled. Patient presenting with left rib pain after fall.     Left chest wall pain  - CT chest, abd, pelvis without any acute changes or signs of fracture.   - Pain is well controlled. Patient presenting with left rib pain after fall.     Left chest wall pain  - CT chest, abd, pelvis without any acute changes or signs of fracture per wet read with Radiology resident. Patient is very eager for discharge and unwilling to stay for formal, final read with attending radiologist. We discussed the risks and benefits of staying for formal read, and patient still wished to leave. He is discharged home with instructions to return if his pain worsens or he has any shortness of breath.  - Pain is well controlled.

## 2019-07-24 NOTE — ED PROVIDER NOTE - CARE PLAN
Assessment and plan of treatment:	Patient presenting with Assessment and plan of treatment:	Patient presenting with left rib pain after fall.     Left chest wall pain  - CT chest, abd, pelvis pending.  - Pain is well controlled. Assessment and plan of treatment:	Patient presenting with left rib pain after fall.     Left chest wall pain  - CT chest, abd, pelvis without any acute changes or signs of fracture.   - Pain is well controlled. Assessment and plan of treatment:	Patient presenting with left rib pain after fall.     Left chest wall pain  - CT chest, abd, pelvis without any acute changes or signs of fracture per wet read with Radiology resident. Patient is very eager for discharge and unwilling to stay for formal, final read with attending radiologist. We discussed the risks and benefits of staying for formal read, and patient still wished to leave. He is discharged home with instructions to return if his pain worsens or he has any shortness of breath.  - Pain is well controlled. Principal Discharge DX:	Chest wall contusion, left, initial encounter  Assessment and plan of treatment:	Patient presenting with left rib pain after fall.     Left chest wall pain  - CT chest, abd, pelvis without any acute changes or signs of fracture per wet read with Radiology resident. Patient is very eager for discharge and unwilling to stay for formal, final read with attending radiologist. We discussed the risks and benefits of staying for formal read, and patient still wished to leave. He is discharged home with instructions to return if his pain worsens or he has any shortness of breath.  - Pain is well controlled.

## 2019-07-24 NOTE — ED PROVIDER NOTE - PROGRESS NOTE DETAILS
CTChest  Abdomen and Pelvis without signs of fracture. PCKD of the right kidney noted. Reviewed study with Radiology who do not appreciate any obvious acute changes on this imaging study. Pain is well-controlled.  Patient eager for discharge home.

## 2019-07-24 NOTE — ED ADULT NURSE NOTE - NSIMPLEMENTINTERV_GEN_ALL_ED
Implemented All Fall with Harm Risk Interventions:  [place] to call system. Call bell, personal items and telephone within reach. Instruct patient to call for assistance. Room bathroom lighting operational. Non-slip footwear when patient is off stretcher. Physically safe environment: no spills, clutter or unnecessary equipment. Stretcher in lowest position, wheels locked, appropriate side rails in place. Provide visual cue, wrist band, yellow gown, etc. Monitor gait and stability. Monitor for mental status changes and reorient to person, place, and time. Review medications for side effects contributing to fall risk. Reinforce activity limits and safety measures with patient and family. Provide visual clues: red socks.

## 2019-07-24 NOTE — ED ADULT NURSE NOTE - CHIEF COMPLAINT QUOTE
Left rib pain, Had echocardiogram today at [facility] doctor's office, missed the step and fell on his left side. Denies LOC, shortness of breathe, not on blood thinner.

## 2019-07-24 NOTE — ED ADULT TRIAGE NOTE - CHIEF COMPLAINT QUOTE
Left rib pain, Had echocardiogram today at [facility] doctor's office, missed the step and fell on his left side. Denies LOC, shortness of breathe, not on blood thinner.

## 2019-07-24 NOTE — ED PROVIDER NOTE - OBJECTIVE STATEMENT
Patient is a 89 year old with history of HTN, BPH, and hypothyroidism who presents with left sided chest pain. Patient states that he was at his Cardiologist's office this morning. He fell after an echocardiogram and struck the left side of his chest against a box. He had sharp pain in the left chest wall following this. Fearful that he had a rib fracture, he came to the ED for further evaluation.

## 2019-07-24 NOTE — ED PROVIDER NOTE - NSFOLLOWUPINSTRUCTIONS_ED_ALL_ED_FT
- Please follow-up with PCP regarding recent hospital stay.   - Please return to ED if you have any worsening pain or trouble breathing.

## 2019-07-24 NOTE — ED PROVIDER NOTE - CLINICAL SUMMARY MEDICAL DECISION MAKING FREE TEXT BOX
Patient with fall at doctor's office. CT scan showed no acute fracture or abnormalities. Mechanical fall c L chest/LUQ tenderness to palpation in elderly man, not on AC.  VSS, nontoxic, well appearing though concern for intra-thoracic/abdominal trauma necessitates CT C/A/P.  Labs, pain Rx, reassess.  --BMM

## 2019-07-25 LAB — SURGICAL PATHOLOGY STUDY: SIGNIFICANT CHANGE UP

## 2019-07-25 PROCEDURE — 74177 CT ABD & PELVIS W/CONTRAST: CPT | Mod: 26

## 2019-08-16 ENCOUNTER — OUTPATIENT (OUTPATIENT)
Dept: OUTPATIENT SERVICES | Facility: HOSPITAL | Age: 84
LOS: 1 days | End: 2019-08-16

## 2019-08-16 VITALS
DIASTOLIC BLOOD PRESSURE: 74 MMHG | RESPIRATION RATE: 14 BRPM | OXYGEN SATURATION: 98 % | HEIGHT: 62 IN | HEART RATE: 60 BPM | WEIGHT: 173.94 LBS | SYSTOLIC BLOOD PRESSURE: 130 MMHG | TEMPERATURE: 96 F

## 2019-08-16 DIAGNOSIS — Z96.651 PRESENCE OF RIGHT ARTIFICIAL KNEE JOINT: Chronic | ICD-10-CM

## 2019-08-16 DIAGNOSIS — E03.9 HYPOTHYROIDISM, UNSPECIFIED: ICD-10-CM

## 2019-08-16 DIAGNOSIS — D03.9 MELANOMA IN SITU, UNSPECIFIED: ICD-10-CM

## 2019-08-16 DIAGNOSIS — R06.83 SNORING: ICD-10-CM

## 2019-08-16 DIAGNOSIS — I10 ESSENTIAL (PRIMARY) HYPERTENSION: ICD-10-CM

## 2019-08-16 DIAGNOSIS — Z98.890 OTHER SPECIFIED POSTPROCEDURAL STATES: Chronic | ICD-10-CM

## 2019-08-16 LAB
ANION GAP SERPL CALC-SCNC: 11 MMO/L — SIGNIFICANT CHANGE UP (ref 7–14)
BLD GP AB SCN SERPL QL: NEGATIVE — SIGNIFICANT CHANGE UP
BUN SERPL-MCNC: 36 MG/DL — HIGH (ref 7–23)
CALCIUM SERPL-MCNC: 9 MG/DL — SIGNIFICANT CHANGE UP (ref 8.4–10.5)
CHLORIDE SERPL-SCNC: 106 MMOL/L — SIGNIFICANT CHANGE UP (ref 98–107)
CO2 SERPL-SCNC: 23 MMOL/L — SIGNIFICANT CHANGE UP (ref 22–31)
CREAT SERPL-MCNC: 1.4 MG/DL — HIGH (ref 0.5–1.3)
GLUCOSE SERPL-MCNC: 116 MG/DL — HIGH (ref 70–99)
HCT VFR BLD CALC: 34.4 % — LOW (ref 39–50)
HGB BLD-MCNC: 10.9 G/DL — LOW (ref 13–17)
MCHC RBC-ENTMCNC: 30.1 PG — SIGNIFICANT CHANGE UP (ref 27–34)
MCHC RBC-ENTMCNC: 31.7 % — LOW (ref 32–36)
MCV RBC AUTO: 95 FL — SIGNIFICANT CHANGE UP (ref 80–100)
NRBC # FLD: 0 K/UL — SIGNIFICANT CHANGE UP (ref 0–0)
PLATELET # BLD AUTO: 225 K/UL — SIGNIFICANT CHANGE UP (ref 150–400)
PMV BLD: 10.3 FL — SIGNIFICANT CHANGE UP (ref 7–13)
POTASSIUM SERPL-MCNC: 4.4 MMOL/L — SIGNIFICANT CHANGE UP (ref 3.5–5.3)
POTASSIUM SERPL-SCNC: 4.4 MMOL/L — SIGNIFICANT CHANGE UP (ref 3.5–5.3)
RBC # BLD: 3.62 M/UL — LOW (ref 4.2–5.8)
RBC # FLD: 12.7 % — SIGNIFICANT CHANGE UP (ref 10.3–14.5)
RH IG SCN BLD-IMP: POSITIVE — SIGNIFICANT CHANGE UP
SODIUM SERPL-SCNC: 140 MMOL/L — SIGNIFICANT CHANGE UP (ref 135–145)
WBC # BLD: 8.34 K/UL — SIGNIFICANT CHANGE UP (ref 3.8–10.5)
WBC # FLD AUTO: 8.34 K/UL — SIGNIFICANT CHANGE UP (ref 3.8–10.5)

## 2019-08-16 NOTE — H&P PST ADULT - NEGATIVE ALLERGY TYPES
no reactions to food/no reactions to animals/no reactions to medicines/no outdoor environmental allergies/no indoor environmental allergies/no reactions to insect bites

## 2019-08-16 NOTE — H&P PST ADULT - RS GEN PE MLT RESP DETAILS PC
no rales/breath sounds equal/airway patent/good air movement/clear to auscultation bilaterally/no rhonchi/respirations non-labored/no wheezes

## 2019-08-16 NOTE — H&P PST ADULT - NEGATIVE GENERAL GENITOURINARY SYMPTOMS
no dysuria/no urine discoloration/no hematuria/no bladder infections/no flank pain L/no incontinence/no flank pain R/no urinary hesitancy/normal urinary frequency

## 2019-08-16 NOTE — H&P PST ADULT - NSICDXPASTMEDICALHX_GEN_ALL_CORE_FT
PAST MEDICAL HISTORY:  Enlarged prostate     Hypertension     Hypothyroidism     Melanoma in situ, unspecified

## 2019-08-16 NOTE — H&P PST ADULT - HISTORY OF PRESENT ILLNESS
Patient is a 89 year old male presents to Presurgical Testing for an evaluation for a scheduled Wide excision left scalp melanoma, wide excision left clavicle melanoma scheduled on 8/27/2019 with Dr. Cason.  Pre op diagnosis: Melanoma in situ, unspecified. Patient reports the Scalp and Left clavicle Melanoma was discovered on a routine Dermatology evaluation in July 2019, Asymptomatic.

## 2019-08-16 NOTE — H&P PST ADULT - SKIN COMMENTS
Pre op diagnosis: Melanoma in situ, unspecified, Left clavicle "Melanoma" noted  - approximately 1cm, and Left scalp " melanoma " noted approximately 5cm

## 2019-08-16 NOTE — H&P PST ADULT - NEGATIVE MUSCULOSKELETAL SYMPTOMS
no joint swelling/no stiffness/no arm pain R/no leg pain L/no back pain/no myalgia/no muscle weakness/no neck pain/no leg pain R/no muscle cramps/no arm pain L

## 2019-08-16 NOTE — H&P PST ADULT - ATTENDING COMMENTS
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 George.    His diagnosis was reviewed with him in my office, and again on the morning of operation.    All questions answered, consent on chart

## 2019-08-16 NOTE — H&P PST ADULT - NEGATIVE ENMT SYMPTOMS
no nasal obstruction/no dysphagia/no sinus symptoms/no vertigo/no nasal congestion/no post-nasal discharge/no hearing difficulty/no gum bleeding/no dry mouth/no nose bleeds/no recurrent cold sores/no ear pain/no tinnitus/no nasal discharge/no throat pain

## 2019-08-16 NOTE — H&P PST ADULT - NEGATIVE BREAST SYMPTOMS
no nipple discharge R/no breast tenderness L/no nipple discharge L/no breast lump R/no breast lump L/no breast tenderness R

## 2019-08-16 NOTE — H&P PST ADULT - NEGATIVE GASTROINTESTINAL SYMPTOMS
no abdominal pain/no constipation/no change in bowel habits/no nausea/no diarrhea/no vomiting/no melena

## 2019-08-16 NOTE — H&P PST ADULT - ASSESSMENT
Patient is scheduled for Wide excision left scalp melanoma, wide excision left clavicle melanoma scheduled on 8/27/2019 with Dr. Cason.  Pre op diagnosis: Melanoma in situ, unspecified.

## 2019-08-16 NOTE — H&P PST ADULT - NEGATIVE OPHTHALMOLOGIC SYMPTOMS
no discharge R/no pain L/no diplopia/no photophobia/no pain R/no irritation L/no blurred vision R/no irritation R/no blurred vision L/no discharge L

## 2019-08-16 NOTE — H&P PST ADULT - NSICDXPROBLEM_GEN_ALL_CORE_FT
PROBLEM DIAGNOSES  Problem: Melanoma in situ, unspecified  Assessment and Plan: Patient is scheduled for Wide excision left scalp melanoma, wide excision left clavicle melanoma scheduled on 8/27/2019 with Dr. Cason.  Preop instructions, pepcid, surgical scrub provided. Pt stated understanding. Teach back method utilized.   Pending Cardiology evaluation per surgeon and obtain for PST- ( Patient's PMD is a cardiologist. Patient reports was evaluated by Cardiologist on August 9th, 2019.       Problem: Snoring  Assessment and Plan: EDDIE precautions, OR booking notified.      Problem: Hypothyroidism  Assessment and Plan: Patient instructed to take Levothyroxine in the AM of surgery with a sip of water.    Problem: Hypertension  Assessment and Plan: Patient instructed to take Enalapril in the AM of surgery with a sip of water.

## 2019-08-20 PROBLEM — D03.9 MELANOMA IN SITU, UNSPECIFIED: Chronic | Status: ACTIVE | Noted: 2019-08-16

## 2019-08-26 ENCOUNTER — TRANSCRIPTION ENCOUNTER (OUTPATIENT)
Age: 84
End: 2019-08-26

## 2019-08-26 RX ORDER — SODIUM CHLORIDE 9 MG/ML
1000 INJECTION INTRAMUSCULAR; INTRAVENOUS; SUBCUTANEOUS
Refills: 0 | Status: DISCONTINUED | OUTPATIENT
Start: 2019-08-27 | End: 2019-09-11

## 2019-08-27 ENCOUNTER — OUTPATIENT (OUTPATIENT)
Dept: OUTPATIENT SERVICES | Facility: HOSPITAL | Age: 84
LOS: 1 days | Discharge: HOME | End: 2019-08-27
Payer: MEDICARE

## 2019-08-27 ENCOUNTER — RESULT REVIEW (OUTPATIENT)
Age: 84
End: 2019-08-27

## 2019-08-27 ENCOUNTER — APPOINTMENT (OUTPATIENT)
Dept: SURGICAL ONCOLOGY | Facility: HOSPITAL | Age: 84
End: 2019-08-27

## 2019-08-27 VITALS
HEART RATE: 60 BPM | WEIGHT: 173.94 LBS | TEMPERATURE: 98 F | OXYGEN SATURATION: 98 % | SYSTOLIC BLOOD PRESSURE: 153 MMHG | RESPIRATION RATE: 17 BRPM | HEIGHT: 62 IN | DIASTOLIC BLOOD PRESSURE: 64 MMHG

## 2019-08-27 VITALS
OXYGEN SATURATION: 98 % | SYSTOLIC BLOOD PRESSURE: 139 MMHG | HEART RATE: 70 BPM | RESPIRATION RATE: 15 BRPM | DIASTOLIC BLOOD PRESSURE: 76 MMHG

## 2019-08-27 DIAGNOSIS — Z96.651 PRESENCE OF RIGHT ARTIFICIAL KNEE JOINT: Chronic | ICD-10-CM

## 2019-08-27 DIAGNOSIS — Z98.890 OTHER SPECIFIED POSTPROCEDURAL STATES: Chronic | ICD-10-CM

## 2019-08-27 DIAGNOSIS — D03.9 MELANOMA IN SITU, UNSPECIFIED: ICD-10-CM

## 2019-08-27 PROCEDURE — 88305 TISSUE EXAM BY PATHOLOGIST: CPT | Mod: 26

## 2019-08-27 PROCEDURE — 11626 EXC S/N/H/F/G MAL+MRG >4 CM: CPT

## 2019-08-27 PROCEDURE — 11604 EXC TR-EXT MAL+MARG 3.1-4 CM: CPT

## 2019-08-27 RX ADMIN — SODIUM CHLORIDE 30 MILLILITER(S): 9 INJECTION INTRAMUSCULAR; INTRAVENOUS; SUBCUTANEOUS at 07:20

## 2019-08-27 NOTE — BRIEF OPERATIVE NOTE - OPERATION/FINDINGS
#1. Large diameter melanoma in situ of the scalp.  #2. Melanoma in situ of left clavicle.    Both areas excised with a minimum 5 mm margin, and immediate reconstruction.

## 2019-08-27 NOTE — ASU DISCHARGE PLAN (ADULT/PEDIATRIC) - CALL YOUR DOCTOR IF YOU HAVE ANY OF THE FOLLOWING:
Bleeding that does not stop/Wound/Surgical Site with redness, or foul smelling discharge or pus/Numbness, tingling, color or temperature change to extremity/Swelling that gets worse/Pain not relieved by Medications/Excessive diarrhea/Increased irritability or sluggishness/Nausea and vomiting that does not stop/Unable to urinate/Inability to tolerate liquids or foods

## 2019-08-27 NOTE — ASU DISCHARGE PLAN (ADULT/PEDIATRIC) - ASU DC SPECIAL INSTRUCTIONSFT
Sleep with head elevated on one or 2 pillows.    Initial followup with plastic surgery in the next 5-15 days.    Dr. Cason should call with pathology report in approximately 10 days, that conversation will determine further management

## 2019-08-27 NOTE — ASU PREOP CHECKLIST - SITE MARKED BY SURGEON
[facility]    [address]    [facility] [address]    Phone:  [phone]       Thank You for choosing us for your health care visit. We are glad to serve you and happy to provide you with this summary of your visit. Please help us to ensure we have accurate records. If you find anything that needs to be changed, please let our staff know as soon as possible.          Your Demographic Information     Patient Name Sex     Nathan Shepherd Male 1960       Ethnic Group Patient Race    Not of  or  Origin White      Your Visit Details     Date & Time Provider Department    2017 8:00 AM Markie Mistry MD [facility]      Your Upcoming Appointment*(Max 10)     2017 11:00 AM CST   Behavioral Health Extended Follow-Up with Bandar Tucker MD   [facility] [place] ([facility])    [address]   [place] [address]   [phone]            Monday April 10, 2017  7:00 AM CDT   Office Visit with Josue Arevalo MD   [facility] Internal Med ([facility])    [address]   [place] [address]   [phone]              Your To Do List     Future Orders Please Complete On or Around Expires    BASIC METABOLIC PANEL  2017 Mar 22, 2017    URIC ACID  2017 Mar 22, 2017    XR ANKLE 3+ VW LEFT  2017 May 21, 2017      We Ordered or Performed the Following     SERVICE TO ENDOCRINOLOGY     SERVICE TO NEPHROLOGY       Conditions Discussed Today or Order-Related Diagnoses        Comments    Acute left ankle pain    -  Primary     CRF (chronic renal failure), stage 4 (severe)         Uncontrolled type 2 diabetes mellitus with stage 4 chronic kidney disease, without long-term current use of insulin           Your Vitals Were     BP Pulse Temp Resp Height Weight    122/70 102 98.3 °F (36.8 °C) (Oral) 18 5' 10\" (1.778 m) 300 lb  (136.1 kg)    SpO2 BMI Smoking Status             97% 43.05 kg/m2 Former Smoker         Medications Prescribed or Re-Ordered Today     predniSONE (DELTASONE) 20 MG tablet    Sig - Route: Take 2 tablets by mouth daily for 5 days. - Oral    Class: Eprescribe    Pharmacy: [id number] [place] [address] [place]  Ph #: [phone]      Your Current Medications Are        Disp Refills Start End    NIFEdipine (ADALAT CC) 90 MG 24 hr tablet 90 tablet 0 1/18/2017     Sig: Take 1 tablet by mouth  daily    Class: Eprescribe    atorvastatin (LIPITOR) 80 MG tablet 90 tablet 1 1/18/2017     Sig: Take 1 tablet by mouth  daily    Class: Eprescribe    metolazone (ZAROXOLYN) 2.5 MG tablet 36 tablet 0 1/13/2017     Sig: TAKE 1 TABLET BY MOUTH  EVERY MONDAY, WEDNESDAY,  AND FRIDAY    Class: Eprescribe    tamsulosin (FLOMAX) 0.4 MG Cap 90 capsule 0 1/11/2017     Sig - Route: Take 1 capsule by mouth daily. - Oral    Class: Eprescribe    Cosign for Ordering: Accepted by Josue Arevalo MD on 1/11/2017  5:10 PM    hydrALAZINE (APRESOLINE) 100 MG tablet 270 tablet 1 1/3/2017     Sig - Route: Take 1 tablet by mouth 3 times daily. - Oral    Class: Eprescribe    Notes to Pharmacy: called [facility] [place]    potassium chloride (KLOR-CON M10) 10 MEQ CR tablet 1080 tablet 0 12/28/2016     Sig: TAKE FOUR TABLETS BY MOUTH THREE TIMES DAILY    Class: Eprescribe    furosemide (LASIX) 80 MG tablet 180 tablet 0 10/24/2016     Sig - Route: Take 1 tablet by mouth 2 times daily. Called to [facility] [place] [phone] - Oral    Class: Script Not Printed    cloNIDine (CATAPRES) 0.2 MG tablet 180 tablet 0 10/24/2016     Sig: TAKE ONE TABLET BY MOUTH TWICE DAILY.    Class: Script Not Printed    Notes to Pharmacy: called [facility] [place] [phone]    sertraline (ZOLOFT) 100 MG tablet 135 tablet 0 10/24/2016     Sig: TAKE ONE AND ONE-HALF TABLETS BY MOUTH ONCE DAILY called to [facility]  [phone] [facility] [place]    Class: Script  Not Printed    nalTREXone (REVIA) 50 MG tablet 90 tablet 3 3/8/2016     Sig - Route: Take 1 tablet by mouth daily. - Oral    Class: Eprescribe    hydrocortisone (CORTIZONE) 2.5 % lotion 59 mL 11 3/18/2015     Sig: Apply once a day to scaly rash of face and ears p.r.n. scaling    Class: Eprescribe    Cosign for Ordering: Accepted by Juliocesar Anderson MD on 3/18/2015 10:29 AM    Omega-3 Fatty Acids (FISH OIL) 1000 MG capsule        Sig - Route: Take 2 g by mouth 2 times daily. - Oral    Class: Historical Med    nitroGLYcerin (NITROSTAT) 0.4 MG SL tablet        Sig - Route: Place 0.4 mg under the tongue every 5 minutes as needed. Take for chest pain. - Sublingual    Class: Historical Med    Multiple Vitamin (MULTI-VITAMIN PO)        Sig - Route: Take 1 tablet by mouth daily. - Oral    Class: Historical Med    predniSONE (DELTASONE) 20 MG tablet 10 tablet 0 2/20/2017 2/25/2017    Sig - Route: Take 2 tablets by mouth daily for 5 days. - Oral    Class: Eprescribe      Allergies     Niacin Other (See Comments)    FLUSHING AND BURNING SENSATION      Immunizations History as of 2/20/2017     Name Date    Influenza 10/27/2014, 1/9/2012, 9/28/2010    Influenza Quadrivalent Preservative Free 10/10/2016    Pneumococcal Polysaccharide Adult 9/28/2010    Td:Adult type tetanus/diphtheria 1/9/2012    Tdap 1/9/2012      Problem List as of 2/20/2017     Hyperlipidemia    Hypertension    Anxiety and depression    Erectile dysfunction    BPH (benign prostatic hypertrophy)    Neuropathy    Obstructive sleep apnea syndrome    Orchitis and epididymitis, unspecified    CAD, CABG in 1997, D-1 stent 5/2011, Stents in RCA and VG to PDA 7/2011    Basal cell carcinoma of cheek    Hematoma of kidney    DMII (diabetes mellitus, type 2)    CKD (chronic kidney disease) stage 4, GFR 15-29 ml/min    Anemia              Patient Instructions    SUGGEST IMMEDIATE SCHEDULING FOR RENAL SVC AS WELL AS ENDOCRINOLOGY.     SUGGEST EST WITH DR. SHA RICHTER IN  INTERNAL MEDICINE.         yes

## 2019-09-05 LAB — SURGICAL PATHOLOGY STUDY: SIGNIFICANT CHANGE UP

## 2019-09-05 NOTE — REASON FOR VISIT
[Initial Consultation] : an initial consultation for [Other: _____] : [unfilled] [FreeTextEntry2] : Melanoma in situ scalp, and left clavicle

## 2019-09-05 NOTE — HISTORY OF PRESENT ILLNESS
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 Large diameter (~5 cm) at least melanoma in situ of the SCALP.\par #2. 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 Eulogio MULLER.\par \par He does not have a pacemaker or defibrillator.\par He takes no anticoagulants.\par He takes a baby aspirin weekly.\par Hypertension treated with enalapril, And Toprol.\par Simvastatin for hyperlipidemia.\par Levothyroxine for hypothyroidism, He does not see an endocrinologist.\par \par Most recent eye examination was 2017.\par \par Colonoscopy at age 89 was normal

## 2019-09-05 NOTE — PHYSICAL EXAM
[Normal] : supple, no neck mass and thyroid not enlarged [Normal Neck Lymph Nodes] : normal neck lymph nodes  [Normal Supraclavicular Lymph Nodes] : normal supraclavicular lymph nodes [Normal Axillary Lymph Nodes] : normal axillary lymph nodes [Normal] : full range of motion and no deformities appreciated [de-identified] : Groins not examined [de-identified] : Below

## 2019-09-05 NOTE — REVIEW OF SYSTEMS
[Negative] : Heme/Lymph [FreeTextEntry5] : Hypertension [de-identified] : Torn rotator cuff [de-identified] : Melanoma [de-identified] : Hypothyroid

## 2019-09-05 NOTE — ASSESSMENT
[FreeTextEntry1] : Large diameter melanoma in situ of scalp, and left clavicle, both of which should be excised with appropriate margins and reconstruction.\par \par Reviewed in detail, all questions answered.\par \par He is in agreement.\par Will have chest x-ray with presurgical testing.\par Papers for surgical scheduling submitted\par \par (09-05-19.\par We spoke.\par August 27, 2019: \par Wide excision of large diameter melanoma in situ from the top of the left side of the scalp, AND a similar lesion at the left clavicle, with negative margins, and reconstructions by Dr. Gary George\par Patient is understandably pleased with the results.\par No invasive component noted.\par My office will call to schedule a postoperative visit)\par \par \par \par Note dictated

## 2019-10-29 NOTE — ED ADULT NURSE NOTE - NS ED NURSE DC INFO COMPLEXITY

## 2020-12-09 ENCOUNTER — INPATIENT (INPATIENT)
Facility: HOSPITAL | Age: 85
LOS: 2 days | Discharge: HOME | DRG: 291 | End: 2020-12-12
Attending: INTERNAL MEDICINE | Admitting: INTERNAL MEDICINE
Payer: MEDICARE

## 2020-12-09 VITALS
HEART RATE: 70 BPM | DIASTOLIC BLOOD PRESSURE: 76 MMHG | TEMPERATURE: 98 F | RESPIRATION RATE: 20 BRPM | SYSTOLIC BLOOD PRESSURE: 137 MMHG | OXYGEN SATURATION: 94 % | HEIGHT: 66 IN | WEIGHT: 179.9 LBS

## 2020-12-09 DIAGNOSIS — J90 PLEURAL EFFUSION, NOT ELSEWHERE CLASSIFIED: ICD-10-CM

## 2020-12-09 DIAGNOSIS — Z96.651 PRESENCE OF RIGHT ARTIFICIAL KNEE JOINT: Chronic | ICD-10-CM

## 2020-12-09 DIAGNOSIS — Z98.890 OTHER SPECIFIED POSTPROCEDURAL STATES: Chronic | ICD-10-CM

## 2020-12-09 LAB
ALBUMIN SERPL ELPH-MCNC: 3.8 G/DL — SIGNIFICANT CHANGE UP (ref 3.3–5)
ALP SERPL-CCNC: 92 U/L — SIGNIFICANT CHANGE UP (ref 40–120)
ALT FLD-CCNC: 37 U/L — SIGNIFICANT CHANGE UP (ref 10–45)
ANION GAP SERPL CALC-SCNC: 10 MMOL/L — SIGNIFICANT CHANGE UP (ref 5–17)
APTT BLD: 28.8 SEC — SIGNIFICANT CHANGE UP (ref 27.5–35.5)
AST SERPL-CCNC: 24 U/L — SIGNIFICANT CHANGE UP (ref 10–40)
BASOPHILS # BLD AUTO: 0.05 K/UL — SIGNIFICANT CHANGE UP (ref 0–0.2)
BASOPHILS NFR BLD AUTO: 0.6 % — SIGNIFICANT CHANGE UP (ref 0–2)
BILIRUB SERPL-MCNC: 0.4 MG/DL — SIGNIFICANT CHANGE UP (ref 0.2–1.2)
BUN SERPL-MCNC: 34 MG/DL — HIGH (ref 7–23)
CALCIUM SERPL-MCNC: 9.1 MG/DL — SIGNIFICANT CHANGE UP (ref 8.4–10.5)
CHLORIDE SERPL-SCNC: 108 MMOL/L — SIGNIFICANT CHANGE UP (ref 96–108)
CK MB CFR SERPL CALC: 6 NG/ML — SIGNIFICANT CHANGE UP (ref 0–6.7)
CK SERPL-CCNC: 99 U/L — SIGNIFICANT CHANGE UP (ref 30–200)
CO2 SERPL-SCNC: 23 MMOL/L — SIGNIFICANT CHANGE UP (ref 22–31)
CREAT SERPL-MCNC: 1.42 MG/DL — HIGH (ref 0.5–1.3)
D DIMER BLD IA.RAPID-MCNC: 320 NG/ML DDU — HIGH
EOSINOPHIL # BLD AUTO: 0.08 K/UL — SIGNIFICANT CHANGE UP (ref 0–0.5)
EOSINOPHIL NFR BLD AUTO: 1 % — SIGNIFICANT CHANGE UP (ref 0–6)
GLUCOSE SERPL-MCNC: 118 MG/DL — HIGH (ref 70–99)
HCT VFR BLD CALC: 37.5 % — LOW (ref 39–50)
HGB BLD-MCNC: 12 G/DL — LOW (ref 13–17)
IMM GRANULOCYTES NFR BLD AUTO: 0.3 % — SIGNIFICANT CHANGE UP (ref 0–1.5)
INR BLD: 1.07 RATIO — SIGNIFICANT CHANGE UP (ref 0.88–1.16)
LYMPHOCYTES # BLD AUTO: 1.69 K/UL — SIGNIFICANT CHANGE UP (ref 1–3.3)
LYMPHOCYTES # BLD AUTO: 21.3 % — SIGNIFICANT CHANGE UP (ref 13–44)
MCHC RBC-ENTMCNC: 30.7 PG — SIGNIFICANT CHANGE UP (ref 27–34)
MCHC RBC-ENTMCNC: 32 GM/DL — SIGNIFICANT CHANGE UP (ref 32–36)
MCV RBC AUTO: 95.9 FL — SIGNIFICANT CHANGE UP (ref 80–100)
MONOCYTES # BLD AUTO: 0.64 K/UL — SIGNIFICANT CHANGE UP (ref 0–0.9)
MONOCYTES NFR BLD AUTO: 8.1 % — SIGNIFICANT CHANGE UP (ref 2–14)
NEUTROPHILS # BLD AUTO: 5.44 K/UL — SIGNIFICANT CHANGE UP (ref 1.8–7.4)
NEUTROPHILS NFR BLD AUTO: 68.7 % — SIGNIFICANT CHANGE UP (ref 43–77)
NRBC # BLD: 0 /100 WBCS — SIGNIFICANT CHANGE UP (ref 0–0)
NT-PROBNP SERPL-SCNC: HIGH PG/ML (ref 0–300)
PLATELET # BLD AUTO: 217 K/UL — SIGNIFICANT CHANGE UP (ref 150–400)
POTASSIUM SERPL-MCNC: 5.5 MMOL/L — HIGH (ref 3.5–5.3)
POTASSIUM SERPL-SCNC: 5.5 MMOL/L — HIGH (ref 3.5–5.3)
PROT SERPL-MCNC: 6.8 G/DL — SIGNIFICANT CHANGE UP (ref 6–8.3)
PROTHROM AB SERPL-ACNC: 12.8 SEC — SIGNIFICANT CHANGE UP (ref 10.6–13.6)
RBC # BLD: 3.91 M/UL — LOW (ref 4.2–5.8)
RBC # FLD: 13 % — SIGNIFICANT CHANGE UP (ref 10.3–14.5)
SARS-COV-2 RNA SPEC QL NAA+PROBE: SIGNIFICANT CHANGE UP
SODIUM SERPL-SCNC: 141 MMOL/L — SIGNIFICANT CHANGE UP (ref 135–145)
TROPONIN T, HIGH SENSITIVITY RESULT: 47 NG/L — SIGNIFICANT CHANGE UP (ref 0–51)
TROPONIN T, HIGH SENSITIVITY RESULT: 52 NG/L — HIGH (ref 0–51)
WBC # BLD: 7.92 K/UL — SIGNIFICANT CHANGE UP (ref 3.8–10.5)
WBC # FLD AUTO: 7.92 K/UL — SIGNIFICANT CHANGE UP (ref 3.8–10.5)

## 2020-12-09 PROCEDURE — 99285 EMERGENCY DEPT VISIT HI MDM: CPT

## 2020-12-09 PROCEDURE — 71045 X-RAY EXAM CHEST 1 VIEW: CPT | Mod: 26

## 2020-12-09 PROCEDURE — 71275 CT ANGIOGRAPHY CHEST: CPT | Mod: 26

## 2020-12-09 RX ORDER — SIMVASTATIN 20 MG/1
10 TABLET, FILM COATED ORAL AT BEDTIME
Refills: 0 | Status: DISCONTINUED | OUTPATIENT
Start: 2020-12-09 | End: 2020-12-12

## 2020-12-09 RX ORDER — SIMVASTATIN 20 MG/1
1 TABLET, FILM COATED ORAL
Qty: 0 | Refills: 0 | DISCHARGE

## 2020-12-09 RX ORDER — FUROSEMIDE 40 MG
40 TABLET ORAL DAILY
Refills: 0 | Status: DISCONTINUED | OUTPATIENT
Start: 2020-12-10 | End: 2020-12-11

## 2020-12-09 RX ORDER — TAMSULOSIN HYDROCHLORIDE 0.4 MG/1
2 CAPSULE ORAL
Qty: 0 | Refills: 0 | DISCHARGE

## 2020-12-09 RX ORDER — FUROSEMIDE 40 MG
20 TABLET ORAL ONCE
Refills: 0 | Status: COMPLETED | OUTPATIENT
Start: 2020-12-09 | End: 2020-12-09

## 2020-12-09 RX ORDER — FAMOTIDINE 10 MG/ML
20 INJECTION INTRAVENOUS
Refills: 0 | Status: DISCONTINUED | OUTPATIENT
Start: 2020-12-09 | End: 2020-12-12

## 2020-12-09 RX ORDER — FOLIC ACID 0.8 MG
1 TABLET ORAL
Qty: 0 | Refills: 0 | DISCHARGE

## 2020-12-09 RX ORDER — FAMOTIDINE 10 MG/ML
1 INJECTION INTRAVENOUS
Qty: 0 | Refills: 0 | DISCHARGE

## 2020-12-09 RX ORDER — ASPIRIN/CALCIUM CARB/MAGNESIUM 324 MG
325 TABLET ORAL ONCE
Refills: 0 | Status: COMPLETED | OUTPATIENT
Start: 2020-12-09 | End: 2020-12-09

## 2020-12-09 RX ORDER — TAMSULOSIN HYDROCHLORIDE 0.4 MG/1
0.8 CAPSULE ORAL AT BEDTIME
Refills: 0 | Status: DISCONTINUED | OUTPATIENT
Start: 2020-12-09 | End: 2020-12-12

## 2020-12-09 RX ORDER — FINASTERIDE 5 MG/1
5 TABLET, FILM COATED ORAL DAILY
Refills: 0 | Status: DISCONTINUED | OUTPATIENT
Start: 2020-12-09 | End: 2020-12-12

## 2020-12-09 RX ORDER — LEVOTHYROXINE SODIUM 125 MCG
150 TABLET ORAL DAILY
Refills: 0 | Status: DISCONTINUED | OUTPATIENT
Start: 2020-12-09 | End: 2020-12-12

## 2020-12-09 RX ORDER — ASPIRIN/CALCIUM CARB/MAGNESIUM 324 MG
81 TABLET ORAL DAILY
Refills: 0 | Status: DISCONTINUED | OUTPATIENT
Start: 2020-12-10 | End: 2020-12-12

## 2020-12-09 RX ORDER — ASPIRIN/CALCIUM CARB/MAGNESIUM 324 MG
1 TABLET ORAL
Qty: 0 | Refills: 0 | DISCHARGE

## 2020-12-09 RX ORDER — HEPARIN SODIUM 5000 [USP'U]/ML
5000 INJECTION INTRAVENOUS; SUBCUTANEOUS EVERY 8 HOURS
Refills: 0 | Status: DISCONTINUED | OUTPATIENT
Start: 2020-12-09 | End: 2020-12-12

## 2020-12-09 RX ORDER — FOLIC ACID 0.8 MG
1 TABLET ORAL DAILY
Refills: 0 | Status: DISCONTINUED | OUTPATIENT
Start: 2020-12-09 | End: 2020-12-12

## 2020-12-09 RX ORDER — CHOLECALCIFEROL (VITAMIN D3) 125 MCG
2000 CAPSULE ORAL DAILY
Refills: 0 | Status: DISCONTINUED | OUTPATIENT
Start: 2020-12-09 | End: 2020-12-12

## 2020-12-09 RX ORDER — CHOLECALCIFEROL (VITAMIN D3) 125 MCG
1 CAPSULE ORAL
Qty: 0 | Refills: 0 | DISCHARGE

## 2020-12-09 RX ORDER — FINASTERIDE 5 MG/1
1 TABLET, FILM COATED ORAL
Qty: 0 | Refills: 0 | DISCHARGE

## 2020-12-09 RX ADMIN — SIMVASTATIN 10 MILLIGRAM(S): 20 TABLET, FILM COATED ORAL at 21:31

## 2020-12-09 RX ADMIN — Medication 325 MILLIGRAM(S): at 16:04

## 2020-12-09 RX ADMIN — Medication 20 MILLIGRAM(S): at 17:58

## 2020-12-09 RX ADMIN — FAMOTIDINE 20 MILLIGRAM(S): 10 INJECTION INTRAVENOUS at 21:31

## 2020-12-09 RX ADMIN — Medication 20 MILLIGRAM(S): at 16:54

## 2020-12-09 RX ADMIN — TAMSULOSIN HYDROCHLORIDE 0.8 MILLIGRAM(S): 0.4 CAPSULE ORAL at 21:30

## 2020-12-09 RX ADMIN — HEPARIN SODIUM 5000 UNIT(S): 5000 INJECTION INTRAVENOUS; SUBCUTANEOUS at 21:31

## 2020-12-09 NOTE — H&P ADULT - NSICDXPASTMEDICALHX_GEN_ALL_CORE_FT
PAST MEDICAL HISTORY:  Enlarged prostate     HLD (hyperlipidemia)     Hypertension     Hypothyroidism     Melanoma in situ, unspecified     Right bundle branch block (RBBB)

## 2020-12-09 NOTE — H&P ADULT - NSHPPHYSICALEXAM_GEN_ALL_CORE
General: WN/WD NAD  PERRLA  Neurology: A&Ox3, nonfocal, ARELLANO x 4  Respiratory: CTA B/L  CV: RRR, S1S2, no murmurs, rubs or gallops  Abdominal: Soft, NT, ND +BS, Last BM  Extremities: No edema, + peripheral pulses  Skin Normal

## 2020-12-09 NOTE — ED PROVIDER NOTE - CLINICAL SUMMARY MEDICAL DECISION MAKING FREE TEXT BOX
Elderly male w 1wk hx of progressive dyspnea. concerns for pe, acs, covid, anemia. Check ekg,trop,dimer,xr, probable admit  Smooth Lr MD, Facep

## 2020-12-09 NOTE — H&P ADULT - NSHPLABSRESULTS_GEN_ALL_CORE
Lab Results:  CBC  CBC Full  -  ( 10 Dec 2020 07:17 )  WBC Count : 7.14 K/uL  RBC Count : 3.52 M/uL  Hemoglobin : 10.7 g/dL  Hematocrit : 33.9 %  Platelet Count - Automated : 185 K/uL  Mean Cell Volume : 96.3 fl  Mean Cell Hemoglobin : 30.4 pg  Mean Cell Hemoglobin Concentration : 31.6 gm/dL  Auto Neutrophil # : x  Auto Lymphocyte # : x  Auto Monocyte # : x  Auto Eosinophil # : x  Auto Basophil # : x  Auto Neutrophil % : x  Auto Lymphocyte % : x  Auto Monocyte % : x  Auto Eosinophil % : x  Auto Basophil % : x    .		Differential:	[] Automated		[] Manual  Chemistry                        10.7   7.14  )-----------( 185      ( 10 Dec 2020 07:17 )             33.9     12-10    138  |  106  |  39<H>  ----------------------------<  88  4.5   |  21<L>  |  1.55<H>    Ca    8.8      10 Dec 2020 07:19  Phos  4.2     12-10  Mg     2.0     12-10    TPro  6.0  /  Alb  3.2<L>  /  TBili  0.3  /  DBili  x   /  AST  16  /  ALT  28  /  AlkPhos  78  12-10    LIVER FUNCTIONS - ( 10 Dec 2020 07:19 )  Alb: 3.2 g/dL / Pro: 6.0 g/dL / ALK PHOS: 78 U/L / ALT: 28 U/L / AST: 16 U/L / GGT: x           PT/INR - ( 09 Dec 2020 15:07 )   PT: 12.8 sec;   INR: 1.07 ratio         PTT - ( 09 Dec 2020 15:07 )  PTT:28.8 sec          MICROBIOLOGY/CULTURES:      RADIOLOGY RESULTS:

## 2020-12-09 NOTE — ED PROVIDER NOTE - PMH
Enlarged prostate    HLD (hyperlipidemia)    Hypertension    Hypothyroidism    Melanoma in situ, unspecified    Right bundle branch block (RBBB)

## 2020-12-09 NOTE — ED PROVIDER NOTE - NS ED ROS FT
Constitutional: No fever or chills  Eyes: No visual changes, eye pain or redness  HEENT: No throat pain, ear pain, nasal pain. No nose bleeding.  CV: No chest pain +lower extremity edema  Resp: +SOB no cough  GI: No abd pain. No nausea or vomiting. No diarrhea. No constipation.   : No dysuria, hematuria.   MSK: No musculoskeletal pain  Skin: No rash  Neuro: No headache. No numbness or tingling. No weakness.

## 2020-12-09 NOTE — H&P ADULT - NSHPREVIEWOFSYSTEMS_GEN_ALL_CORE
REVIEW OF SYSTEMS:    CONSTITUTIONAL: No weakness, fevers or chills  EYES/ENT: No visual changes;  No vertigo or throat pain   NECK: No pain or stiffness  RESPIRATORY: + shortness of breath  CARDIOVASCULAR: No chest pain or palpitations  GASTROINTESTINAL: No abdominal or epigastric pain. No nausea, vomiting, or hematemesis; No diarrhea or constipation. No melena or hematochezia.  GENITOURINARY: No dysuria, frequency or hematuria  NEUROLOGICAL: No numbness or weakness  SKIN: No itching, burning, rashes, or lesions   All other review of systems is negative unless indicated above.

## 2020-12-09 NOTE — ED PROVIDER NOTE - PHYSICAL EXAMINATION
Gen: AAO x 3, NAD  Skin: No rashes or lesions  HEENT: NC/AT, PERRLA, EOMI, MMM  Resp: unlabored CTAB 1+ b/l edema   Cardiac: rrr s1s2, no murmurs, rubs or gallops  GI: ND, +BS, Soft, NT  Ext: FROM in all extremities  Neuro: no focal deficits

## 2020-12-09 NOTE — ED PROVIDER NOTE - ATTENDING CONTRIBUTION TO CARE
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 Pt comes to ed c/o 1wk hx of progressive sob/urrutia/fatigue. Now pt shortness of breath walking across room.No cp,fever,chills,abd pain. nvdc. PE Adult male looking younger than stated age. NCAT neck supple chest clear ap cv no rubs, gallops or murmurs neuro no focal defects, abd soft +bs  Smooth Lr MD, Facep

## 2020-12-09 NOTE — CONSULT NOTE ADULT - ASSESSMENT
89 year old  male with Right bundle branch block (RBBB), HLD, HTN, Melanoma, Hypothyroidism, BPH presenting with SOB -acute CHF     1. Acute CHF  -Check Echo to eval LV fx, valvular disease   -HS trop elevated but down-trending ( 52-47) likely demand ischemia in the setting of acute CHF, chen   -ECG with known RBBB, no acs   -CTA chest neg for pe, + bl pleural effusion, unchanged lower lober groundglass opacities ? fibrosis   -Volume overloaded on exam,  elevated proBNP  -s/p lasix 20 mg IVP x1 in the ED   -given another 20 mg IVP x 1 now  -Start 40 mg IVP lasix Daily tomorrow  -check le dopplers  -tele   -add low dose ASA   -r/o covid 19    2. murmur  -reports hx of aortic valve disease   + murmur on exam.  follow up echo     3. HTN   -resume outpt meds     dvt ppx     89 year old  male with Right bundle branch block (RBBB), HLD, HTN, Melanoma, Hypothyroidism, BPH presenting with SOB -acute CHF     1. Acute CHF  -Check Echo to eval LV fx, valvular disease   -HS trop elevated but down-trending ( 52-47) likely demand ischemia in the setting of acute CHF, chen   -ECG with known RBBB, no acs   -CTA chest neg for pe, + bl pleural effusion, unchanged lower lober groundglass opacities ? fibrosis   -Volume overloaded on exam,  elevated proBNP  -s/p lasix 20 mg IVP x1 in the ED   -given another 20 mg IVP x 1 now  -Start 40 mg IVP lasix Daily tomorrow  -check le dopplers  -tele   -add low dose ASA , low dose BB  -r/o covid 19    2. murmur  -reports hx of aortic valve disease   + murmur on exam.  follow up echo     3. HTN   -resume outpt meds     dvt ppx

## 2020-12-09 NOTE — ED PROVIDER NOTE - PROGRESS NOTE DETAILS
Attending MD Ryder: suspected new onset CHF, admitted to dr dunaway for IV diuresis and further work up.

## 2020-12-09 NOTE — CONSULT NOTE ADULT - SUBJECTIVE AND OBJECTIVE BOX
CARDIOLOGY CONSULT - Dr. Foley         HPI:      PAST MEDICAL & SURGICAL HISTORY:  Right bundle branch block (RBBB)    HLD (hyperlipidemia)    Melanoma in situ, unspecified    Hypothyroidism    Enlarged prostate    Hypertension    H/O total knee replacement, right    History of ankle surgery  Right ankle            PREVIOUS DIAGNOSTIC TESTING:    [ ] Echocardiogram:  [ ]  Catheterization:  [ ] Stress Test:  	    MEDICATIONS:  Home Medications:  enalapril 5 mg oral tablet: 1 tab(s) orally once a day (27 Aug 2019 06:37)  famotidine 40 mg oral tablet: 1 tab(s) orally once a day (at bedtime) (27 Aug 2019 06:37)  finasteride 5 mg oral tablet: 1 tab(s) orally once a day (27 Aug 2019 06:37)  Flomax 0.4 mg oral capsule: 2 cap(s) orally once a day (at bedtime) (27 Aug 2019 06:37)  folic acid 0.8 mg oral tablet: 1 tab(s) orally once a day (27 Aug 2019 06:37)  levothyroxine 150 mcg (0.15 mg) oral tablet: 1 tab(s) orally once a day (27 Aug 2019 06:37)  simvastatin 10 mg oral tablet: 1 tab(s) orally once a day (at bedtime) (27 Aug 2019 06:37)  Vitamin D3 1000 intl units oral tablet: 1 tab(s) orally once a day (27 Aug 2019 06:37)      MEDICATIONS  (STANDING):  furosemide   Injectable 20 milliGRAM(s) IV Push Once      FAMILY HISTORY:  FH: heart disease  Mother        SOCIAL HISTORY:    [ ] Non-smoker  [ ] Smoker  [ ] Alcohol    Allergies    No Known Allergies    Intolerances    	    REVIEW OF SYSTEMS:  CONSTITUTIONAL: No fever, weight loss, or fatigue  EYES: No eye pain, visual disturbances, or discharge  ENMT:  No difficulty hearing, tinnitus, vertigo; No sinus or throat pain  NECK: No pain or stiffness  RESPIRATORY: No cough, wheezing, chills or hemoptysis; No Shortness of Breath  CARDIOVASCULAR: No chest pain, palpitations, passing out, dizziness, or leg swelling  GASTROINTESTINAL: No abdominal or epigastric pain. No nausea, vomiting, or hematemesis; No diarrhea or constipation. No melena or hematochezia.  GENITOURINARY: No dysuria, frequency, hematuria, or incontinence  NEUROLOGICAL: No headaches, memory loss, loss of strength, numbness, or tremors  SKIN: No itching, burning, rashes, or lesions   	    [ ] All others negative	  [ ] Unable to obtain    PHYSICAL EXAM:  T(C): 36.6 (12-09-20 @ 14:30), Max: 36.7 (12-09-20 @ 13:24)  HR: 63 (12-09-20 @ 14:42) (58 - 70)  BP: 121/60 (12-09-20 @ 14:42) (115/70 - 137/76)  RR: 18 (12-09-20 @ 14:42) (18 - 22)  SpO2: 96% (12-09-20 @ 14:42) (94% - 96%)  Wt(kg): --  I&O's Summary      Appearance: Normal	  Psychiatry: A & O x 3, Mood & affect appropriate  HEENT:   Normal oral mucosa, PERRL, EOMI	  Lymphatic: No lymphadenopathy  Cardiovascular: Normal S1 S2,RRR, No JVD, No murmurs  Respiratory: Lungs clear to auscultation	  Gastrointestinal:  Soft, Non-tender, + BS	  Skin: No rashes, No ecchymoses, No cyanosis	  Neurologic: Non-focal  Extremities: Normal range of motion, No clubbing, cyanosis or edema  Vascular: Peripheral pulses palpable 2+ bilaterally    TELEMETRY: 	    ECG:  	  RADIOLOGY:  OTHER: 	  	  LABS:	 	    CARDIAC MARKERS:  Troponin T, High Sensitivity Result: 52 ng/L (12-09 @ 15:07)                                  12.0   7.92  )-----------( 217      ( 09 Dec 2020 15:07 )             37.5     12-09    141  |  108  |  34<H>  ----------------------------<  118<H>  5.5<H>   |  23  |  1.42<H>    Ca    9.1      09 Dec 2020 15:07    TPro  6.8  /  Alb  3.8  /  TBili  0.4  /  DBili  x   /  AST  24  /  ALT  37  /  AlkPhos  92  12-09    PT/INR - ( 09 Dec 2020 15:07 )   PT: 12.8 sec;   INR: 1.07 ratio         PTT - ( 09 Dec 2020 15:07 )  PTT:28.8 sec  proBNP: Serum Pro-Brain Natriuretic Peptide: 59258 pg/mL (12-09 @ 15:07)    Lipid Profile:   HgA1c:   TSH:        CARDIOLOGY CONSULT - Dr. Foley         HPI:  89 year old  male with hx as mentioned below presenting with progressive GRAHAM x 1 week. He denies any associated cp, palps. Denies increase in LE edema, orthopnea, or PND. NO recent cardiac work up. Reports remote hx of aortic valve problem, unsure is AS vs AI. He was apparently told he may need surgery in the future. Also reports hx RBBB. He is a former smoker.  Also reports dizziness with quick positional movement. Otherwise he is functional at home. Denies hx of MI, CAD or arrhythmias. He is not on diuretics at home. Denies fever, chills, recent sick contact. ROS otherwise negative         PAST MEDICAL & SURGICAL HISTORY:  Right bundle branch block (RBBB)    HLD (hyperlipidemia)    Melanoma in situ, unspecified    Hypothyroidism    Enlarged prostate    Hypertension    H/O total knee replacement, right    History of ankle surgery  Right ankle            PREVIOUS DIAGNOSTIC TESTING:    [ ] Echocardiogram:    [ ]  Catheterization:    [ ] Stress Test:  	        MEDICATIONS:  Home Medications:  enalapril 5 mg oral tablet: 1 tab(s) orally once a day (27 Aug 2019 06:37)  famotidine 40 mg oral tablet: 1 tab(s) orally once a day (at bedtime) (27 Aug 2019 06:37)  finasteride 5 mg oral tablet: 1 tab(s) orally once a day (27 Aug 2019 06:37)  Flomax 0.4 mg oral capsule: 2 cap(s) orally once a day (at bedtime) (27 Aug 2019 06:37)  folic acid 0.8 mg oral tablet: 1 tab(s) orally once a day (27 Aug 2019 06:37)  levothyroxine 150 mcg (0.15 mg) oral tablet: 1 tab(s) orally once a day (27 Aug 2019 06:37)  simvastatin 10 mg oral tablet: 1 tab(s) orally once a day (at bedtime) (27 Aug 2019 06:37)  Vitamin D3 1000 intl units oral tablet: 1 tab(s) orally once a day (27 Aug 2019 06:37)      MEDICATIONS  (STANDING):  furosemide   Injectable 20 milliGRAM(s) IV Push Once      FAMILY HISTORY:  FH: heart disease  Mother        SOCIAL HISTORY:    [ x] Former smoker     Allergies    No Known Allergies    Intolerances    	    REVIEW OF SYSTEMS:  CONSTITUTIONAL: No fever, weight loss, or fatigue  EYES: No eye pain, visual disturbances, or discharge  ENMT:  No difficulty hearing, tinnitus, vertigo; No sinus or throat pain  NECK: No pain or stiffness  RESPIRATORY: see hpi   CARDIOVASCULAR: No chest pain, palpitations, passing out, dizziness, or leg swelling  GASTROINTESTINAL: No abdominal or epigastric pain. No nausea, vomiting, or hematemesis; No diarrhea or constipation. No melena or hematochezia.  GENITOURINARY: No dysuria, frequency, hematuria, or incontinence  NEUROLOGICAL: No headaches, memory loss, loss of strength, numbness, or tremors  SKIN: No itching, burning, rashes, or lesions   	    [x ] All others negative	  [ ] Unable to obtain    PHYSICAL EXAM:  T(C): 36.6 (12-09-20 @ 14:30), Max: 36.7 (12-09-20 @ 13:24)  HR: 63 (12-09-20 @ 14:42) (58 - 70)  BP: 121/60 (12-09-20 @ 14:42) (115/70 - 137/76)  RR: 18 (12-09-20 @ 14:42) (18 - 22)  SpO2: 96% (12-09-20 @ 14:42) (94% - 96%)  Wt(kg): --  I&O's Summary      Appearance: Normal	  Psychiatry: A & O x 3, Mood & affect appropriate  HEENT:   Normal oral mucosa, PERRL, EOMI	  Lymphatic: No lymphadenopathy  Cardiovascular: Normal S1 S2,RRR, + sys murmur  Respiratory: crackles at base 	  Gastrointestinal:  Soft, Non-tender, + BS	  Skin: No rashes, No ecchymoses, No cyanosis	  Neurologic: Non-focal  Extremities: bl le edema ++     TELEMETRY: 	    ECG:  NSR1st degree avb. RBBB, possible old inferior infarct 	  RADIOLOGY:    < from: CT Angio Chest w/ IV Cont (12.09.20 @ 16:20) >  FINDINGS:    LUNGS AND AIRWAYS: Patent central airways.  Unchanged bilateral lower lobe groundglass opacities. Unchanged left upper lobe nodule (2:16).    PLEURA: Small bilateral pleural effusions.    MEDIASTINUM AND JESSY: No lymphadenopathy.    VESSELS: No pulmonary embolus. Aortic calcifications.    HEART: Heart size is normal. No pericardial effusion. Calcified aortic and mitral valves.    CHEST WALL AND LOWER NECK: Within normal limits.    VISUALIZED UPPER ABDOMEN: Bilateral renal cysts.    BONES: Degenerative changes.    IMPRESSION:    No pulmonary embolism.    Unchanged bilateral lower lobe groundglass opacities and compared with July 20, 2019 examination. These likely represent early changes of fibrosis.    Small bilateral pleural effusions.          OTHER: 	  	  LABS:	 	    CARDIAC MARKERS:  Troponin T, High Sensitivity Result: 52 ng/L (12-09 @ 15:07)        Troponin T, High Sensitivity Result: 47: Specimen not hemolyzed  *  *  Rapid upward or downward changes in high-sensitivity troponin levels  suggest acute myocardial injury. Renal impairment may cause sustained  troponin elevations.  Normal: <6 - 14 ng/L  Indeterminate: 15-51 ng/L  Elevated: > 51 ng/L  See http://labs/test/TROPTHS on the [employer] intranet for more  information ng/L (12.09.20 @ 16:10)                              12.0   7.92  )-----------( 217      ( 09 Dec 2020 15:07 )             37.5     12-09    141  |  108  |  34<H>  ----------------------------<  118<H>  5.5<H>   |  23  |  1.42<H>    Ca    9.1      09 Dec 2020 15:07    TPro  6.8  /  Alb  3.8  /  TBili  0.4  /  DBili  x   /  AST  24  /  ALT  37  /  AlkPhos  92  12-09    PT/INR - ( 09 Dec 2020 15:07 )   PT: 12.8 sec;   INR: 1.07 ratio         PTT - ( 09 Dec 2020 15:07 )  PTT:28.8 sec  proBNP: Serum Pro-Brain Natriuretic Peptide: 23343 pg/mL (12-09 @ 15:07)    Lipid Profile:   HgA1c:   TSH:

## 2020-12-09 NOTE — H&P ADULT - ASSESSMENT
89yom pmhx HTN HLD hx of Melanoma here with week of progressively worsening sob and urrutia. reports past 2 days with minimal activity along exhaustion. no fever no sob no travel hx. No calf pain or leg swelling. Former smoker. No cardiac hx.

## 2020-12-09 NOTE — ED ADULT NURSE NOTE - NSIMPLEMENTINTERV_GEN_ALL_ED
Implemented All Fall with Harm Risk Interventions:  [place] to call system. Call bell, personal items and telephone within reach. Instruct patient to call for assistance. Room bathroom lighting operational. Non-slip footwear when patient is off stretcher. Physically safe environment: no spills, clutter or unnecessary equipment. Stretcher in lowest position, wheels locked, appropriate side rails in place. Provide visual cue, wrist band, yellow gown, etc. Monitor gait and stability. Monitor for mental status changes and reorient to person, place, and time. Review medications for side effects contributing to fall risk. Reinforce activity limits and safety measures with patient and family. Provide visual clues: red socks.

## 2020-12-09 NOTE — ED ADULT NURSE NOTE - OBJECTIVE STATEMENT
1425 89 yr old  c/o feeling SOB and fatigue for a few days. Denies fever, chills, chest pain, dizziness , cough or congestion. Denies COVID19 exposure. color pink. skin W&D. Airborne and contact isolation maintained. Fall risk precautions maintained. A&Ox4. color pink. skin W&D

## 2020-12-09 NOTE — H&P ADULT - NSICDXPASTSURGICALHX_GEN_ALL_CORE_FT
PAST SURGICAL HISTORY:  H/O total knee replacement, right     History of ankle surgery Right ankle

## 2020-12-10 DIAGNOSIS — I10 ESSENTIAL (PRIMARY) HYPERTENSION: ICD-10-CM

## 2020-12-10 DIAGNOSIS — J81.1 CHRONIC PULMONARY EDEMA: ICD-10-CM

## 2020-12-10 DIAGNOSIS — E03.9 HYPOTHYROIDISM, UNSPECIFIED: ICD-10-CM

## 2020-12-10 LAB
ALBUMIN SERPL ELPH-MCNC: 3.2 G/DL — LOW (ref 3.3–5)
ALP SERPL-CCNC: 78 U/L — SIGNIFICANT CHANGE UP (ref 40–120)
ALT FLD-CCNC: 28 U/L — SIGNIFICANT CHANGE UP (ref 10–45)
ANION GAP SERPL CALC-SCNC: 11 MMOL/L — SIGNIFICANT CHANGE UP (ref 5–17)
AST SERPL-CCNC: 16 U/L — SIGNIFICANT CHANGE UP (ref 10–40)
BILIRUB SERPL-MCNC: 0.3 MG/DL — SIGNIFICANT CHANGE UP (ref 0.2–1.2)
BUN SERPL-MCNC: 39 MG/DL — HIGH (ref 7–23)
CALCIUM SERPL-MCNC: 8.8 MG/DL — SIGNIFICANT CHANGE UP (ref 8.4–10.5)
CHLORIDE SERPL-SCNC: 106 MMOL/L — SIGNIFICANT CHANGE UP (ref 96–108)
CO2 SERPL-SCNC: 21 MMOL/L — LOW (ref 22–31)
CREAT SERPL-MCNC: 1.55 MG/DL — HIGH (ref 0.5–1.3)
GLUCOSE SERPL-MCNC: 88 MG/DL — SIGNIFICANT CHANGE UP (ref 70–99)
HCT VFR BLD CALC: 33.9 % — LOW (ref 39–50)
HGB BLD-MCNC: 10.7 G/DL — LOW (ref 13–17)
MAGNESIUM SERPL-MCNC: 2 MG/DL — SIGNIFICANT CHANGE UP (ref 1.6–2.6)
MCHC RBC-ENTMCNC: 30.4 PG — SIGNIFICANT CHANGE UP (ref 27–34)
MCHC RBC-ENTMCNC: 31.6 GM/DL — LOW (ref 32–36)
MCV RBC AUTO: 96.3 FL — SIGNIFICANT CHANGE UP (ref 80–100)
NRBC # BLD: 0 /100 WBCS — SIGNIFICANT CHANGE UP (ref 0–0)
PHOSPHATE SERPL-MCNC: 4.2 MG/DL — SIGNIFICANT CHANGE UP (ref 2.5–4.5)
PLATELET # BLD AUTO: 185 K/UL — SIGNIFICANT CHANGE UP (ref 150–400)
POTASSIUM SERPL-MCNC: 4.5 MMOL/L — SIGNIFICANT CHANGE UP (ref 3.5–5.3)
POTASSIUM SERPL-SCNC: 4.5 MMOL/L — SIGNIFICANT CHANGE UP (ref 3.5–5.3)
PROT SERPL-MCNC: 6 G/DL — SIGNIFICANT CHANGE UP (ref 6–8.3)
RBC # BLD: 3.52 M/UL — LOW (ref 4.2–5.8)
RBC # FLD: 13.1 % — SIGNIFICANT CHANGE UP (ref 10.3–14.5)
SARS-COV-2 IGG SERPL QL IA: NEGATIVE — SIGNIFICANT CHANGE UP
SARS-COV-2 IGM SERPL IA-ACNC: <0.1 INDEX — SIGNIFICANT CHANGE UP
SODIUM SERPL-SCNC: 138 MMOL/L — SIGNIFICANT CHANGE UP (ref 135–145)
WBC # BLD: 7.14 K/UL — SIGNIFICANT CHANGE UP (ref 3.8–10.5)
WBC # FLD AUTO: 7.14 K/UL — SIGNIFICANT CHANGE UP (ref 3.8–10.5)

## 2020-12-10 PROCEDURE — 93010 ELECTROCARDIOGRAM REPORT: CPT

## 2020-12-10 PROCEDURE — 93970 EXTREMITY STUDY: CPT | Mod: 26

## 2020-12-10 PROCEDURE — 99222 1ST HOSP IP/OBS MODERATE 55: CPT

## 2020-12-10 RX ORDER — INFLUENZA VIRUS VACCINE 15; 15; 15; 15 UG/.5ML; UG/.5ML; UG/.5ML; UG/.5ML
0.5 SUSPENSION INTRAMUSCULAR ONCE
Refills: 0 | Status: DISCONTINUED | OUTPATIENT
Start: 2020-12-10 | End: 2020-12-12

## 2020-12-10 RX ADMIN — Medication 1 MILLIGRAM(S): at 09:21

## 2020-12-10 RX ADMIN — FAMOTIDINE 20 MILLIGRAM(S): 10 INJECTION INTRAVENOUS at 06:11

## 2020-12-10 RX ADMIN — FAMOTIDINE 20 MILLIGRAM(S): 10 INJECTION INTRAVENOUS at 17:49

## 2020-12-10 RX ADMIN — Medication 40 MILLIGRAM(S): at 06:11

## 2020-12-10 RX ADMIN — FINASTERIDE 5 MILLIGRAM(S): 5 TABLET, FILM COATED ORAL at 09:21

## 2020-12-10 RX ADMIN — TAMSULOSIN HYDROCHLORIDE 0.8 MILLIGRAM(S): 0.4 CAPSULE ORAL at 21:01

## 2020-12-10 RX ADMIN — Medication 150 MICROGRAM(S): at 06:11

## 2020-12-10 RX ADMIN — SIMVASTATIN 10 MILLIGRAM(S): 20 TABLET, FILM COATED ORAL at 21:01

## 2020-12-10 RX ADMIN — HEPARIN SODIUM 5000 UNIT(S): 5000 INJECTION INTRAVENOUS; SUBCUTANEOUS at 06:11

## 2020-12-10 RX ADMIN — Medication 2000 UNIT(S): at 09:21

## 2020-12-10 RX ADMIN — HEPARIN SODIUM 5000 UNIT(S): 5000 INJECTION INTRAVENOUS; SUBCUTANEOUS at 14:17

## 2020-12-10 RX ADMIN — HEPARIN SODIUM 5000 UNIT(S): 5000 INJECTION INTRAVENOUS; SUBCUTANEOUS at 21:02

## 2020-12-10 NOTE — PROGRESS NOTE ADULT - SUBJECTIVE AND OBJECTIVE BOX
PATIENT REQUESTING FOR DR VALE BAER TO EVALUATE HIM WHILE AN INPATIENT AS HIS DTR WAS TRYING TO ARRANGE OUTPT F/U WITH HIM     D/W DR VALE BAER    HE WILL SEE PATIENT LEI AND ASSUME PRIMARY CARDIAC CARE

## 2020-12-10 NOTE — PROVIDER CONTACT NOTE (OTHER) - ASSESSMENT
Patient awake.asymptomatic during episode-Vitals BP-121/64, heart rate 67, O 2 sat 94% on 2 L/NC.Denies chest pain,palpitation and SOB.

## 2020-12-10 NOTE — CONSULT NOTE ADULT - SUBJECTIVE AND OBJECTIVE BOX
PULMONARY CONSULT  Luis Castro MD  [phone]    Initial HPI on admission:  HPI:  89yom pmhx HTN HLD hx of Melanoma here with week of progressively worsening sob and urrutia. reports past 2 days with minimal activity along exhaustion. no fever no sob no travel hx. No calf pain or leg swelling. Former smoker. No cardiac hx. (09 Dec 2020 16:56)      BRIEF HOSPITAL COURSE: ***    PAST MEDICAL & SURGICAL HISTORY:  Right bundle branch block (RBBB)    HLD (hyperlipidemia)    Melanoma in situ, unspecified    Hypothyroidism    Enlarged prostate    Hypertension    H/O total knee replacement, right    History of ankle surgery  Right ankle      Allergies    No Known Allergies    Intolerances      FAMILY HISTORY:  FH: heart disease  Mother    SH:    Non smoker    Review of Systems: REVIEW OF SYSTEMS:    CONSTITUTIONAL: No weakness, fevers or chills  EYES/ENT: No visual changes;  No vertigo or throat pain   NECK: No pain or stiffness  RESPIRATORY: + shortness of breath  CARDIOVASCULAR: No chest pain or palpitations  GASTROINTESTINAL: No abdominal or epigastric pain. No nausea, vomiting, or hematemesis; No diarrhea or constipation. No melena or hematochezia.  GENITOURINARY: No dysuria, frequency or hematuria  NEUROLOGICAL: No numbness or weakness  SKIN: No itching, burning, rashes, or lesions   All other review of systems is negative unless indicated above.      Allergies and Intolerances:        Allergies:  	No Known Allergies:     Medications:  MEDICATIONS  (STANDING):  aspirin enteric coated 81 milliGRAM(s) Oral daily  cholecalciferol 2000 Unit(s) Oral daily  famotidine    Tablet 20 milliGRAM(s) Oral two times a day  finasteride 5 milliGRAM(s) Oral daily  folic acid 1 milliGRAM(s) Oral daily  furosemide   Injectable 40 milliGRAM(s) IV Push daily  heparin   Injectable 5000 Unit(s) SubCutaneous every 8 hours  influenza   Vaccine 0.5 milliLiter(s) IntraMuscular once  levothyroxine 150 MICROGram(s) Oral daily  simvastatin 10 milliGRAM(s) Oral at bedtime  tamsulosin 0.8 milliGRAM(s) Oral at bedtime    MEDICATIONS  (PRN):    Vital Signs Last 24 Hrs  T(C): 36.5 (10 Dec 2020 05:59), Max: 36.7 (09 Dec 2020 13:24)  T(F): 97.7 (10 Dec 2020 05:59), Max: 98.1 (09 Dec 2020 23:09)  HR: 67 (10 Dec 2020 07:04) (58 - 74)  BP: 121/64 (10 Dec 2020 07:04) (99/42 - 153/111)  BP(mean): --  RR: 17 (10 Dec 2020 07:04) (17 - 22)  SpO2: 94% (10 Dec 2020 07:04) (90% - 100%)          12-09 @ 07:01  -  12-10 @ 07:00  --------------------------------------------------------  IN: 0 mL / OUT: 200 mL / NET: -200 mL      LABS:                        10.7   7.14  )-----------( 185      ( 10 Dec 2020 07:17 )             33.9     12-10    138  |  106  |  39<H>  ----------------------------<  88  4.5   |  21<L>  |  1.55<H>    Ca    8.8      10 Dec 2020 07:19  Phos  4.2     12-10  Mg     2.0     12-10    TPro  6.0  /  Alb  3.2<L>  /  TBili  0.3  /  DBili  x   /  AST  16  /  ALT  28  /  AlkPhos  78  12-10      PT/INR - ( 09 Dec 2020 15:07 )   PT: 12.8 sec;   INR: 1.07 ratio         PTT - ( 09 Dec 2020 15:07 )  PTT:28.8 sec      Serum Pro-Brain Natriuretic Peptide: 72161 pg/mL (12-09-20 @ 15:07)    Physical Examination:    General: No acute distress.      HEENT: Pupils equal, reactive to light.  Symmetric.    PULM: Clear to auscultation bilaterally, no significant sputum production    CVS: Regular rate and rhythm, no murmurs, rubs, or gallops    ABD: Soft, nondistended, nontender, normoactive bowel sounds, no masses    EXT: No edema, nontender    SKIN: Warm and well perfused, no rashes noted.    NEURO: Alert, oriented, interactive, nonfocal    RADIOLOGY REVIEWED PERSONALLY  CXR:    PROCEDURE DATE:  12/09/2020      INTERPRETATION:  CLINICAL INFORMATION: Shortness of breath.    TECHNIQUE: Frontal radiograph of the chest.    COMPARISON: CT chest dated 7/24/2019    FINDINGS:    LUNGS: Prominent interstitial markings and patchy airspace opacities.    PLEURA: Small right-sided pleural effusion. No pneumothorax.    HEART AND MEDIASTINUM: Unable to assess heart size and this projection.    SKELETON: Unremarkable skeletal structures.      IMPRESSION:    Pulmonary edema.        CT chest:    PROCEDURE DATE:  12/09/2020      INTERPRETATION:  CLINICAL INFORMATION: Shortness of breath, dyspnea on exertion, history of melanoma, positive d-dimer    COMPARISON: CT chest 7/24/2019    PROCEDURE:  CT Angiography of the Chest.  90 ml of Omnipaque 350 was injected intravenously. 10 ml were discarded.  Sagittal and coronal reformats were performed as well as 3D (MIP) reconstructions.    FINDINGS:    LUNGS AND AIRWAYS: Patent central airways.  Unchanged bilateral lower lobe groundglass opacities. Unchanged left upper lobe nodule (2:16).    PLEURA: Small bilateral pleural effusions.    MEDIASTINUM AND JESSY: No lymphadenopathy.    VESSELS: No pulmonary embolus. Aortic calcifications.    HEART: Heart size is normal. No pericardial effusion. Calcified aortic and mitral valves.    CHEST WALL AND LOWER NECK: Within normal limits.    VISUALIZED UPPER ABDOMEN: Bilateral renal cysts.    BONES: Degenerative changes.    IMPRESSION:    No pulmonary embolism.    Unchanged bilateral lower lobe groundglass opacities and compared with July 20, 2019 examination. These likely represent early changes of fibrosis.    Small bilateral pleural effusions.        TTE:     PULMONARY CONSULT  Luis Castro MD  [phone]    Initial HPI on admission:  HPI:  89yom pmhx HTN HLD hx of Melanoma here with week of progressively worsening sob and urrutia. reports past 2 days with minimal activity along exhaustion. no fever no sob no travel hx. No calf pain or leg swelling. Former smoker. No cardiac hx.     Patient is a non smoker and denies history of COPD, Asthma, or "pulmonary fibrosis"  At time of visit, was breathing comfortably on RA with oxygen sat 98%  CT reviewed: smalll bilateral efusions wth some basilar GG - no clear sugg of fibrosis    PAST MEDICAL & SURGICAL HISTORY:  Right bundle branch block (RBBB)    HLD (hyperlipidemia)    Melanoma in situ, unspecified    Hypothyroidism    Enlarged prostate    Hypertension    H/O total knee replacement, right    History of ankle surgery  Right ankle      Allergies    No Known Allergies    Intolerances      FAMILY HISTORY:  FH: heart disease  Mother    SH:    Non smoker    Review of Systems: REVIEW OF SYSTEMS:    CONSTITUTIONAL: No weakness, fevers or chills  EYES/ENT: No visual changes;  No vertigo or throat pain   NECK: No pain or stiffness  RESPIRATORY: + shortness of breath  CARDIOVASCULAR: No chest pain or palpitations  GASTROINTESTINAL: No abdominal or epigastric pain. No nausea, vomiting, or hematemesis; No diarrhea or constipation. No melena or hematochezia.  GENITOURINARY: No dysuria, frequency or hematuria  NEUROLOGICAL: No numbness or weakness  SKIN: No itching, burning, rashes, or lesions   All other review of systems is negative unless indicated above.      Allergies and Intolerances:        Allergies:  	No Known Allergies:     Medications:  MEDICATIONS  (STANDING):  aspirin enteric coated 81 milliGRAM(s) Oral daily  cholecalciferol 2000 Unit(s) Oral daily  famotidine    Tablet 20 milliGRAM(s) Oral two times a day  finasteride 5 milliGRAM(s) Oral daily  folic acid 1 milliGRAM(s) Oral daily  furosemide   Injectable 40 milliGRAM(s) IV Push daily  heparin   Injectable 5000 Unit(s) SubCutaneous every 8 hours  influenza   Vaccine 0.5 milliLiter(s) IntraMuscular once  levothyroxine 150 MICROGram(s) Oral daily  simvastatin 10 milliGRAM(s) Oral at bedtime  tamsulosin 0.8 milliGRAM(s) Oral at bedtime    MEDICATIONS  (PRN):    Vital Signs Last 24 Hrs  T(C): 36.5 (10 Dec 2020 05:59), Max: 36.7 (09 Dec 2020 13:24)  T(F): 97.7 (10 Dec 2020 05:59), Max: 98.1 (09 Dec 2020 23:09)  HR: 67 (10 Dec 2020 07:04) (58 - 74)  BP: 121/64 (10 Dec 2020 07:04) (99/42 - 153/111)  BP(mean): --  RR: 17 (10 Dec 2020 07:04) (17 - 22)  SpO2: 94% (10 Dec 2020 07:04) (90% - 100%)          12-09 @ 07:01  -  12-10 @ 07:00  --------------------------------------------------------  IN: 0 mL / OUT: 200 mL / NET: -200 mL      LABS:                        10.7   7.14  )-----------( 185      ( 10 Dec 2020 07:17 )             33.9     12-10    138  |  106  |  39<H>  ----------------------------<  88  4.5   |  21<L>  |  1.55<H>    Ca    8.8      10 Dec 2020 07:19  Phos  4.2     12-10  Mg     2.0     12-10    TPro  6.0  /  Alb  3.2<L>  /  TBili  0.3  /  DBili  x   /  AST  16  /  ALT  28  /  AlkPhos  78  12-10      PT/INR - ( 09 Dec 2020 15:07 )   PT: 12.8 sec;   INR: 1.07 ratio         PTT - ( 09 Dec 2020 15:07 )  PTT:28.8 sec      Serum Pro-Brain Natriuretic Peptide: 36146 pg/mL (12-09-20 @ 15:07)    Physical Examination:    General: No acute distress.      HEENT: Pupils equal, reactive to light.  Symmetric.    PULM: Clear to auscultation bilaterally, no significant sputum production    CVS: Regular rate and rhythm, no murmurs, rubs, or gallops    ABD: Soft, nondistended, nontender, normoactive bowel sounds, no masses    EXT: No edema, nontender    SKIN: Warm and well perfused, no rashes noted.    NEURO: Alert, oriented, interactive, nonfocal    RADIOLOGY REVIEWED PERSONALLY  CXR:    PROCEDURE DATE:  12/09/2020      INTERPRETATION:  CLINICAL INFORMATION: Shortness of breath.    TECHNIQUE: Frontal radiograph of the chest.    COMPARISON: CT chest dated 7/24/2019    FINDINGS:    LUNGS: Prominent interstitial markings and patchy airspace opacities.    PLEURA: Small right-sided pleural effusion. No pneumothorax.    HEART AND MEDIASTINUM: Unable to assess heart size and this projection.    SKELETON: Unremarkable skeletal structures.      IMPRESSION:    Pulmonary edema.        CT chest:    PROCEDURE DATE:  12/09/2020      INTERPRETATION:  CLINICAL INFORMATION: Shortness of breath, dyspnea on exertion, history of melanoma, positive d-dimer    COMPARISON: CT chest 7/24/2019    PROCEDURE:  CT Angiography of the Chest.  90 ml of Omnipaque 350 was injected intravenously. 10 ml were discarded.  Sagittal and coronal reformats were performed as well as 3D (MIP) reconstructions.    FINDINGS:    LUNGS AND AIRWAYS: Patent central airways.  Unchanged bilateral lower lobe groundglass opacities. Unchanged left upper lobe nodule (2:16).    PLEURA: Small bilateral pleural effusions.    MEDIASTINUM AND JESSY: No lymphadenopathy.    VESSELS: No pulmonary embolus. Aortic calcifications.    HEART: Heart size is normal. No pericardial effusion. Calcified aortic and mitral valves.    CHEST WALL AND LOWER NECK: Within normal limits.    VISUALIZED UPPER ABDOMEN: Bilateral renal cysts.    BONES: Degenerative changes.    IMPRESSION:    No pulmonary embolism.    Unchanged bilateral lower lobe groundglass opacities and compared with July 20, 2019 examination. These likely represent early changes of fibrosis.    Small bilateral pleural effusions.    TTE:

## 2020-12-10 NOTE — PROVIDER CONTACT NOTE (OTHER) - ASSESSMENT
Patient fast asleep,easily arousable.Denies dizziness,chest discomfort and SOB.Vitals BP-118/63, heart rate -69, O 2 sat -95% at room air.

## 2020-12-10 NOTE — CONSULT NOTE ADULT - ASSESSMENT
89yom pmhx HTN HLD hx of Melanoma here with week of progressively worsening sob and urrutia. reports past 2 days with minimal activity along exhaustion.    EP plan:   monitor on tele  obtain echocardiogram 89M HTN, HLD, Aortic stenosis (likely moderate-severe), p/w ADHF, found to have bradycardia with 2:1 likely Wenckebach      EP plan:   Patient never had syncope or symptoms.   Telemetry episodes suggestive of Wenckebach while sleeping.   No indication for PPM at this time. Discontinue toprol XL     Discussed with Dr. Flores

## 2020-12-10 NOTE — PROVIDER CONTACT NOTE (OTHER) - ASSESSMENT
Pt is A&ox4. VVS. PT denies any chest pain or discomfort.  Pt is back in 1st degree HB. Pt was asleep at time of ectopy.

## 2020-12-10 NOTE — PROGRESS NOTE ADULT - ATTENDING COMMENTS
Patient seen and examined.  Agree with above NP note.  CV STABLE  CLINICALLY IMPROVED WITH IV LASIX   CONTINUE AS ORDERED  CHANGE TO PO LEI  AWAIT ECHO TO EVAL LV FXN, AS  AV BLOCK NOTED OVERNIGHT   EPS F/U   HOLD BB

## 2020-12-10 NOTE — PROGRESS NOTE ADULT - SUBJECTIVE AND OBJECTIVE BOX
CARDIOLOGY FOLLOW UP - Dr. Foley    CC no cp  SOB improving   tele event  noted. multiple episodes of bradycards with 2nd degree HB         PHYSICAL EXAM:  T(C): 36.5 (12-10-20 @ 05:59), Max: 36.7 (12-09-20 @ 13:24)  HR: 67 (12-10-20 @ 07:04) (58 - 74)  BP: 121/64 (12-10-20 @ 07:04) (99/42 - 153/111)  RR: 17 (12-10-20 @ 07:04) (17 - 22)  SpO2: 94% (12-10-20 @ 07:04) (90% - 100%)  Wt(kg): --  I&O's Summary    09 Dec 2020 07:01  -  10 Dec 2020 07:00  --------------------------------------------------------  IN: 0 mL / OUT: 200 mL / NET: -200 mL    10 Dec 2020 07:01  -  10 Dec 2020 11:53  --------------------------------------------------------  IN: 180 mL / OUT: 900 mL / NET: -720 mL        Appearance: Normal	  Cardiovascular: Normal S1 S2,RRR, sys murmur   Respiratory: diminished   Gastrointestinal:  Soft, Non-tender, + BS	  Extremities: bl le edema +       Home Medications:  aspirin 81 mg oral delayed release tablet: 1 tab(s) orally once a week on monday (09 Dec 2020 17:58)  enalapril 5 mg oral tablet: 1 tab(s) orally once a day (09 Dec 2020 17:58)  famotidine 40 mg oral tablet: 1 tab(s) orally once a day (at bedtime) (09 Dec 2020 17:58)  finasteride 5 mg oral tablet: 1 tab(s) orally once a day (09 Dec 2020 17:58)  Flomax 0.4 mg oral capsule: 2 cap(s) orally once a day (at bedtime) (09 Dec 2020 17:58)  folic acid 0.8 mg oral tablet: 1 tab(s) orally once a day (09 Dec 2020 17:58)  levothyroxine 150 mcg (0.15 mg) oral tablet: 1 tab(s) orally once a day (09 Dec 2020 17:58)  simvastatin 10 mg oral tablet: 1 tab(s) orally once a day (at bedtime) (09 Dec 2020 17:58)  Toprol-XL 25 mg oral tablet, extended release: 1 tab(s) orally once a day (at bedtime) (09 Dec 2020 17:58)  Vitamin D3 1000 intl units oral tablet: 1 tab(s) orally once a day (09 Dec 2020 17:58)      MEDICATIONS  (STANDING):  aspirin enteric coated 81 milliGRAM(s) Oral daily  cholecalciferol 2000 Unit(s) Oral daily  famotidine    Tablet 20 milliGRAM(s) Oral two times a day  finasteride 5 milliGRAM(s) Oral daily  folic acid 1 milliGRAM(s) Oral daily  furosemide   Injectable 40 milliGRAM(s) IV Push daily  heparin   Injectable 5000 Unit(s) SubCutaneous every 8 hours  influenza   Vaccine 0.5 milliLiter(s) IntraMuscular once  levothyroxine 150 MICROGram(s) Oral daily  simvastatin 10 milliGRAM(s) Oral at bedtime  tamsulosin 0.8 milliGRAM(s) Oral at bedtime      TELEMETRY: NSR with 1st degree HB, hr 70  -over night multiple episodes of bradycardia , 30s,   with 2nd degree type 2 	    ECG:  	  RADIOLOGY:   DIAGNOSTIC TESTING:  [ ] Echocardiogram:  [ ]  Catheterization:  [ ] Stress Test:    OTHER: 	    LABS:	 	    Troponin T, High Sensitivity Result: 47 ng/L [0 - 51] (12-09 @ 16:10)  Creatine Kinase, Serum: 99 U/L [30 - 200] (12-09 @ 16:10)  CKMB Units: 6.0 ng/mL [0.0 - 6.7] (12-09 @ 16:10)  Troponin T, High Sensitivity Result: 52 ng/L [0 - 51] (12-09 @ 15:07)                          10.7   7.14  )-----------( 185      ( 10 Dec 2020 07:17 )             33.9     12-10    138  |  106  |  39<H>  ----------------------------<  88  4.5   |  21<L>  |  1.55<H>    Ca    8.8      10 Dec 2020 07:19  Phos  4.2     12-10  Mg     2.0     12-10    TPro  6.0  /  Alb  3.2<L>  /  TBili  0.3  /  DBili  x   /  AST  16  /  ALT  28  /  AlkPhos  78  12-10    PT/INR - ( 09 Dec 2020 15:07 )   PT: 12.8 sec;   INR: 1.07 ratio         PTT - ( 09 Dec 2020 15:07 )  PTT:28.8 sec         CARDIOLOGY FOLLOW UP - Dr. Floey    CC no cp  SOB improving   tele event  noted. multiple episodes of bradycards with 2nd degree HB         PHYSICAL EXAM:  T(C): 36.5 (12-10-20 @ 05:59), Max: 36.7 (12-09-20 @ 13:24)  HR: 67 (12-10-20 @ 07:04) (58 - 74)  BP: 121/64 (12-10-20 @ 07:04) (99/42 - 153/111)  RR: 17 (12-10-20 @ 07:04) (17 - 22)  SpO2: 94% (12-10-20 @ 07:04) (90% - 100%)  Wt(kg): --  I&O's Summary    09 Dec 2020 07:01  -  10 Dec 2020 07:00  --------------------------------------------------------  IN: 0 mL / OUT: 200 mL / NET: -200 mL    10 Dec 2020 07:01  -  10 Dec 2020 11:53  --------------------------------------------------------  IN: 180 mL / OUT: 900 mL / NET: -720 mL        Appearance: Normal	  Cardiovascular: Normal S1 S2,RRR, sys murmur   Respiratory: diminished   Gastrointestinal:  Soft, Non-tender, + BS	  Extremities: bl le edema +       Home Medications:  aspirin 81 mg oral delayed release tablet: 1 tab(s) orally once a week on monday (09 Dec 2020 17:58)  enalapril 5 mg oral tablet: 1 tab(s) orally once a day (09 Dec 2020 17:58)  famotidine 40 mg oral tablet: 1 tab(s) orally once a day (at bedtime) (09 Dec 2020 17:58)  finasteride 5 mg oral tablet: 1 tab(s) orally once a day (09 Dec 2020 17:58)  Flomax 0.4 mg oral capsule: 2 cap(s) orally once a day (at bedtime) (09 Dec 2020 17:58)  folic acid 0.8 mg oral tablet: 1 tab(s) orally once a day (09 Dec 2020 17:58)  levothyroxine 150 mcg (0.15 mg) oral tablet: 1 tab(s) orally once a day (09 Dec 2020 17:58)  simvastatin 10 mg oral tablet: 1 tab(s) orally once a day (at bedtime) (09 Dec 2020 17:58)  Toprol-XL 25 mg oral tablet, extended release: 1 tab(s) orally once a day (at bedtime) (09 Dec 2020 17:58)  Vitamin D3 1000 intl units oral tablet: 1 tab(s) orally once a day (09 Dec 2020 17:58)      MEDICATIONS  (STANDING):  aspirin enteric coated 81 milliGRAM(s) Oral daily  cholecalciferol 2000 Unit(s) Oral daily  famotidine    Tablet 20 milliGRAM(s) Oral two times a day  finasteride 5 milliGRAM(s) Oral daily  folic acid 1 milliGRAM(s) Oral daily  furosemide   Injectable 40 milliGRAM(s) IV Push daily  heparin   Injectable 5000 Unit(s) SubCutaneous every 8 hours  influenza   Vaccine 0.5 milliLiter(s) IntraMuscular once  levothyroxine 150 MICROGram(s) Oral daily  simvastatin 10 milliGRAM(s) Oral at bedtime  tamsulosin 0.8 milliGRAM(s) Oral at bedtime      TELEMETRY: NSR with 1st degree HB, hr 70  -over night multiple episodes of bradycardia , 30s,   with 2nd degree type 1	    ECG:  	  RADIOLOGY:   DIAGNOSTIC TESTING:  [ ] Echocardiogram:  [ ]  Catheterization:  [ ] Stress Test:    OTHER: 	    LABS:	 	    Troponin T, High Sensitivity Result: 47 ng/L [0 - 51] (12-09 @ 16:10)  Creatine Kinase, Serum: 99 U/L [30 - 200] (12-09 @ 16:10)  CKMB Units: 6.0 ng/mL [0.0 - 6.7] (12-09 @ 16:10)  Troponin T, High Sensitivity Result: 52 ng/L [0 - 51] (12-09 @ 15:07)                          10.7   7.14  )-----------( 185      ( 10 Dec 2020 07:17 )             33.9     12-10    138  |  106  |  39<H>  ----------------------------<  88  4.5   |  21<L>  |  1.55<H>    Ca    8.8      10 Dec 2020 07:19  Phos  4.2     12-10  Mg     2.0     12-10    TPro  6.0  /  Alb  3.2<L>  /  TBili  0.3  /  DBili  x   /  AST  16  /  ALT  28  /  AlkPhos  78  12-10    PT/INR - ( 09 Dec 2020 15:07 )   PT: 12.8 sec;   INR: 1.07 ratio         PTT - ( 09 Dec 2020 15:07 )  PTT:28.8 sec

## 2020-12-10 NOTE — PROVIDER CONTACT NOTE (OTHER) - ASSESSMENT
Patient fast asleep,easily arousable.Vitals BP-121/64, heart rate 61/mt,O 2 sat-96% on 2 L/NC .Currently not on any betablockers.R2 pads at bedside.

## 2020-12-10 NOTE — CONSULT NOTE ADULT - ATTENDING COMMENTS
agree with the above assessment and plan by ELMER Hilliard.  89 year old  male with Right bundle branch block (RBBB), HLD, HTN, Melanoma, Hypothyroidism, BPH presenting with SOB -acute CHF  -Check Echo to eval LV fx, valvular disease   -HS trop elevated but down-trending ( 52-47) likely demand ischemia in the setting of acute CHF, chen   -ECG with known RBBB, no acs   -CTA chest neg for pe, + bl pleural effusion, unchanged lower lober groundglass opacities ? fibrosis   -Volume overloaded on exam,  elevated proBNP  -s/p lasix 20 mg IVP x1 in the ED   -given another 20 mg IVP x 1 now  -Start 40 mg IVP lasix Daily tomorrow  -check le dopplers  -tele   -add low dose ASA , low dose BB  -r/o covid 19    2. murmur  -reports hx of aortic valve disease   + murmur on exam.  follow up echo
Agree with above. He does have first degree AV block in conjuction with RBBB and periods of 2:1 AV block at the quynh level during sleep. He has no symptoms related to the bradyarrhythmias. He has postural dizziness but no unprovoked syncope or presyncope.    No immediate indication for cardiac pacing.

## 2020-12-10 NOTE — CHART NOTE - NSCHARTNOTEFT_GEN_A_CORE
ANASTASIA SAMAYOA  89y Male  Patient is a 89y old  Male who presents with a chief complaint of sob (09 Dec 2020 16:56)     PAST MEDICAL & SURGICAL HISTORY:  Right bundle branch block (RBBB)    HLD (hyperlipidemia)    Melanoma in situ, unspecified    Hypothyroidism    Enlarged prostate    Hypertension    H/O total knee replacement, right    History of ankle surgery  Right ankle      Vital Signs Last 24 Hrs  T(C): 36.7 (09 Dec 2020 23:09), Max: 36.7 (09 Dec 2020 13:24)  T(F): 98.1 (09 Dec 2020 23:09), Max: 98.1 (09 Dec 2020 23:09)  HR: 69 (10 Dec 2020 00:40) (58 - 74)  BP: 118/63 (10 Dec 2020 00:40) (115/70 - 153/111)  BP(mean): --  RR: 17 (10 Dec 2020 00:40) (17 - 22)  SpO2: 95% (10 Dec 2020 00:40) (94% - 100%)    Physical Exam   Neuro: AAOx3  Pulm: bilaterally clear   Cardio: S1,S2, RRR + systolic murmur   GI: soft , no rebound, guarding or distention     Event Summary:    Nurse informed that patient's HR dropped to 34 while sleeping. Pt was checked at bedside and is asymptomatic. Patient showed no signs of distress. EKG was done and showed alternating sinus and type 1 heart block. Patient was awoken for physical exam, and showed no abnormalities. Patient's home medication showed BB usage, but has not been continued upon admission.       Plan:  Echo in the AM   Doppler in the AM   Cardiology will follow up in the AM YAO ANASTASIA  89y Male  Patient is a 89y old  Male who presents with a chief complaint of sob (09 Dec 2020 16:56)     PAST MEDICAL & SURGICAL HISTORY:  Right bundle branch block (RBBB)    HLD (hyperlipidemia)    Melanoma in situ, unspecified    Hypothyroidism    Enlarged prostate    Hypertension    H/O total knee replacement, right    History of ankle surgery  Right ankle      Vital Signs Last 24 Hrs  T(C): 36.7 (09 Dec 2020 23:09), Max: 36.7 (09 Dec 2020 13:24)  T(F): 98.1 (09 Dec 2020 23:09), Max: 98.1 (09 Dec 2020 23:09)  HR: 69 (10 Dec 2020 00:40) (58 - 74)  BP: 118/63 (10 Dec 2020 00:40) (115/70 - 153/111)  BP(mean): --  RR: 17 (10 Dec 2020 00:40) (17 - 22)  SpO2: 95% (10 Dec 2020 00:40) (94% - 100%)    Physical Exam   Neuro: AAOx3  Pulm: CTA clear   Cardio: S1,S2, RRR + systolic murmur   GI: soft , non-distention, non-tender  Event Summary:    Nurse informed that patient's HR dropped to 34 while sleeping. Pt was checked at bedside and is asymptomatic. Patient showed no signs of distress. EKG was done and showed alternating sinus and type 2 wenckebach. Patient was awoken for physical exam, and showed no abnormalities. Patient's home medication showed BB usage, but has not been continued upon admission.       Plan:  R2 at bedside  Continue to watch off AV quynh agents   Echo in the AM   Doppler in the AM   Cardiology will follow up in the AM

## 2020-12-10 NOTE — PROGRESS NOTE ADULT - ASSESSMENT
89 year old  male with Right bundle branch block (RBBB), HLD, HTN, Melanoma, Hypothyroidism, BPH presenting with SOB -acute CHF     1. Acute CHF  -Check Echo to eval LV fx, valvular disease   -HS trop elevated but down-trending ( 52-47) likely demand ischemia in the setting of acute CHF, chen   -ECG with known RBBB, no acs   -CTA chest neg for pe, + bl pleural effusion, unchanged lower lober groundglass opacities ? fibrosis   -Volume status improving, creat  mildly elevated today   -likely change lasix to 40 mg PO tomorrow   -pending le dopplers  -low dose ASA   -covid 19 neg     2. murmur  -reports hx of aortic valve disease   + murmur on exam.  follow up echo     3. HTN   -stable, briefly hypotensive early this morning. repeat stable, antihtn meds on hold     4. 1st degree HB, 2ndegree HB  RBBB  -tele events noted. episode of bradycardia with 2nd degree HB type II   -pt is on toprol at home however has not received any AVN med since admission  -continue to hold BB   -Echo pending  -EP eval       dvt ppx     89 year old  male with Right bundle branch block (RBBB), HLD, HTN, Melanoma, Hypothyroidism, BPH presenting with SOB -acute CHF     1. Acute CHF  -Check Echo to eval LV fx, valvular disease   -HS trop elevated but down-trending ( 52-47) likely demand ischemia in the setting of acute CHF, chen   -ECG with known RBBB, no acs   -CTA chest neg for pe, + bl pleural effusion, unchanged lower lober groundglass opacities ? fibrosis   -Volume status improving, creat  mildly elevated today   -likely change lasix to 40 mg PO tomorrow   -pending le dopplers  -low dose ASA   -covid 19 neg     2. murmur  -reports hx of aortic valve disease   + murmur on exam.  follow up echo     3. HTN   -stable, briefly hypotensive early this morning. repeat stable, antihtn meds on hold     4. 1st degree HB, 2ndegree HB  RBBB  -tele events noted. episode of bradycardia with 2nd degree HB type 1   -pt is on toprol at home however has not received any AVN med since admission  -continue to hold BB   -Echo pending  -EP eval       dvt ppx

## 2020-12-10 NOTE — PROGRESS NOTE ADULT - ASSESSMENT
89yom pmhx HTN HLD hx of Melanoma here with week of progressively worsening sob and urrutia. reports past 2 days with minimal activity along exhaustion. no fever no sob no travel hx. No calf pain or leg swelling. Former smoker. No cardiac hx. 89yom pmhx HTN HLD hx of Melanoma here with week of progressively worsening sob and urrutia. reports past 2 days with minimal activity along exhaustion. no fever no sob no travel hx. No calf pain or leg swelling. Former smoker. No cardiac hx.    Problem/Plan - 1:  ·  Problem: Pulmonary edema.  Plan: acute chf exacerbation  cards fu  check echo  diuresis as per cards.     Problem/Plan - 2:  ·  Problem: Hypothyroidism.  Plan: cw synthyroid.     Problem/Plan - 3:  ·  Problem: Hypertension.  Plan: cw home meds  DASH diet.

## 2020-12-10 NOTE — CONSULT NOTE ADULT - ASSESSMENT
Dyspnea due to acute congestive heart failure with small bilateral effusion, trace LE edema  CT not suggestive of IPF  TTE pending  Patient responded to IV diuretics with resolution of dyspnea  RA sat now 98%    REC    CHF evaluation per cardiolgy  TTE pending  Monitor resp status

## 2020-12-10 NOTE — PROGRESS NOTE ADULT - SUBJECTIVE AND OBJECTIVE BOX
Patient is a 89y old  Male who presents with a chief complaint of sob (10 Dec 2020 13:54)      INTERVAL HPI/OVERNIGHT EVENTS:  T(C): 36.5 (12-10-20 @ 05:59), Max: 36.7 (12-09-20 @ 23:09)  HR: 67 (12-10-20 @ 07:04) (61 - 74)  BP: 121/64 (12-10-20 @ 07:04) (99/42 - 153/111)  RR: 17 (12-10-20 @ 07:04) (17 - 20)  SpO2: 94% (12-10-20 @ 07:04) (90% - 100%)  Wt(kg): --  I&O's Summary    09 Dec 2020 07:01  -  10 Dec 2020 07:00  --------------------------------------------------------  IN: 0 mL / OUT: 200 mL / NET: -200 mL    10 Dec 2020 07:01  -  10 Dec 2020 16:25  --------------------------------------------------------  IN: 480 mL / OUT: 900 mL / NET: -420 mL        LABS:                        10.7   7.14  )-----------( 185      ( 10 Dec 2020 07:17 )             33.9     12-10    138  |  106  |  39<H>  ----------------------------<  88  4.5   |  21<L>  |  1.55<H>    Ca    8.8      10 Dec 2020 07:19  Phos  4.2     12-10  Mg     2.0     12-10    TPro  6.0  /  Alb  3.2<L>  /  TBili  0.3  /  DBili  x   /  AST  16  /  ALT  28  /  AlkPhos  78  12-10    PT/INR - ( 09 Dec 2020 15:07 )   PT: 12.8 sec;   INR: 1.07 ratio         PTT - ( 09 Dec 2020 15:07 )  PTT:28.8 sec    CAPILLARY BLOOD GLUCOSE                MEDICATIONS  (STANDING):  aspirin enteric coated 81 milliGRAM(s) Oral daily  cholecalciferol 2000 Unit(s) Oral daily  famotidine    Tablet 20 milliGRAM(s) Oral two times a day  finasteride 5 milliGRAM(s) Oral daily  folic acid 1 milliGRAM(s) Oral daily  furosemide   Injectable 40 milliGRAM(s) IV Push daily  heparin   Injectable 5000 Unit(s) SubCutaneous every 8 hours  influenza   Vaccine 0.5 milliLiter(s) IntraMuscular once  levothyroxine 150 MICROGram(s) Oral daily  simvastatin 10 milliGRAM(s) Oral at bedtime  tamsulosin 0.8 milliGRAM(s) Oral at bedtime    MEDICATIONS  (PRN):          PHYSICAL EXAM:  GENERAL: NAD, well-groomed, well-developed  HEAD:  Atraumatic, Normocephalic  CHEST/LUNG: Clear to percussion bilaterally; No rales, rhonchi, wheezing, or rubs  HEART: Regular rate and rhythm; No murmurs, rubs, or gallops  ABDOMEN: Soft, Nontender, Nondistended; Bowel sounds present  EXTREMITIES:  2+ Peripheral Pulses, No clubbing, cyanosis, or edema  LYMPH: No lymphadenopathy noted  SKIN: No rashes or lesions    Care Discussed with Consultants/Other Providers [ ] YES  [ ] NO

## 2020-12-10 NOTE — CONSULT NOTE ADULT - SUBJECTIVE AND OBJECTIVE BOX
For all Cardiology service contact information, go to [email or web address] and use "[employer]" to login.    HPI:    89yo m pmhx HTN HLD hx of Melanoma here with week of progressively worsening sob and urrutia. Reports past 2 days with minimal activity along exhaustion. no fever no sob no travel hx. No calf pain or leg swelling. Former smoker. No cardiac hx. (09 Dec 2020 16:56)    EP consulted for "AV block" overnight.     PAST MEDICAL & SURGICAL HISTORY:  Right bundle branch block (RBBB)    HLD (hyperlipidemia)    Melanoma in situ, unspecified    Hypothyroidism    Enlarged prostate    Hypertension    H/O total knee replacement, right    History of ankle surgery  Right ankle      SHx: smoking [ ], alcohol [ ], recreational drugs [ ]     Cardiology Data:  -------------------------------------------------------------------------------------------  ROS:  CV: chest pain (-), palpitation (-), orthopnea (-), PND (-), edema (-)  PULM: SOB (-), cough (-), wheezing (-), hemoptysis (-).   CONST: fever (-), chills (-) or fatigue (-)  GI: abdominal distension (-), abdominal pain (-) , nausea/vomiting (-), hematemesis, (-), melena (-), hematochezia (-)  : dysuria (-), frequency (-), hematuria (-).   NEURO: numbness (-), weakness (-), dizziness (-)  SKIN: itching (-), rash (-)  HEENT:  visual changes (-); vertigo or throat pain (-);  neck stiffness (-)     All other review of systems is negative unless indicated above.   -------------------------------------------------------------------------------------------  VS:  T(C): 36.5 (12-10-20 @ 05:59), Max: 36.7 (12-09-20 @ 13:24)  HR: 67 (12-10-20 @ 07:04) (58 - 74)  BP: 121/64 (12-10-20 @ 07:04) (99/42 - 153/111)  RR: 17 (12-10-20 @ 07:04) (17 - 22)  SpO2: 94% (12-10-20 @ 07:04) (90% - 100%)  I&O's Summary    09 Dec 2020 07:01  -  10 Dec 2020 07:00  --------------------------------------------------------  IN: 0 mL / OUT: 200 mL / NET: -200 mL    10 Dec 2020 07:01  -  10 Dec 2020 10:50  --------------------------------------------------------  IN: 180 mL / OUT: 900 mL / NET: -720 mL      -------------------------------------------------------------------------------------------  EXAM:     -------------------------------------------------------------------------------------------  LABS:                        10.7   7.14  )-----------( 185      ( 10 Dec 2020 07:17 )             33.9      12-10    138  |  106  |  39<H>  ----------------------------<  88  4.5   |  21<L>  |  1.55<H>    Ca    8.8      10 Dec 2020 07:19  Phos  4.2     12-10  Mg     2.0     12-10    TPro  6.0  /  Alb  3.2<L>  /  TBili  0.3  /  DBili  x   /  AST  16  /  ALT  28  /  AlkPhos  78  12-10     PT/INR - ( 09 Dec 2020 15:07 )   PT: 12.8 sec;   INR: 1.07 ratio         PTT - ( 09 Dec 2020 15:07 )  PTT:28.8 sec   CARDIAC MARKERS ( 09 Dec 2020 16:10 )  x     / x     / 99 U/L / x     / 6.0 ng/mL      Troponin trend:  Creatine Kinase, Serum: 99 U/L (12-09-20 @ 16:10)    -------------------------------------------------------------------------------------------  Current Meds:  aspirin enteric coated 81 milliGRAM(s) Oral daily  cholecalciferol 2000 Unit(s) Oral daily  famotidine    Tablet 20 milliGRAM(s) Oral two times a day  finasteride 5 milliGRAM(s) Oral daily  folic acid 1 milliGRAM(s) Oral daily  furosemide   Injectable 40 milliGRAM(s) IV Push daily  heparin   Injectable 5000 Unit(s) SubCutaneous every 8 hours  influenza   Vaccine 0.5 milliLiter(s) IntraMuscular once  levothyroxine 150 MICROGram(s) Oral daily  simvastatin 10 milliGRAM(s) Oral at bedtime  tamsulosin 0.8 milliGRAM(s) Oral at bedtime    ------------------------------------------------------------------------------------------- For all Cardiology service contact information, go to [email or web address] and use "[employer]" to login.    HPI:    89yo m pmhx HTN HLD hx of Melanoma here with week of progressively worsening sob and urrutia due to decompensated HF.   EP consulted for "AV block" overnight    PAST MEDICAL & SURGICAL HISTORY:  Right bundle branch block (RBBB)    HLD (hyperlipidemia)    Melanoma in situ, unspecified    Hypothyroidism    Enlarged prostate    Hypertension    H/O total knee replacement, right    History of ankle surgery  Right ankle      SHx: smoking [ ], alcohol [ ], recreational drugs [ ]     Cardiology Data:  -------------------------------------------------------------------------------------------  ROS:  CV: chest pain (-), palpitation (-), orthopnea (-), PND (-), edema (-)  PULM: SOB (-), cough (-), wheezing (-), hemoptysis (-).   CONST: fever (-), chills (-) or fatigue (-)  GI: abdominal distension (-), abdominal pain (-) , nausea/vomiting (-), hematemesis, (-), melena (-), hematochezia (-)  : dysuria (-), frequency (-), hematuria (-).   NEURO: numbness (-), weakness (-), dizziness (-)  SKIN: itching (-), rash (-)  HEENT:  visual changes (-); vertigo or throat pain (-);  neck stiffness (-)     All other review of systems is negative unless indicated above.   -------------------------------------------------------------------------------------------  VS:  T(C): 36.5 (12-10-20 @ 05:59), Max: 36.7 (12-09-20 @ 13:24)  HR: 67 (12-10-20 @ 07:04) (58 - 74)  BP: 121/64 (12-10-20 @ 07:04) (99/42 - 153/111)  RR: 17 (12-10-20 @ 07:04) (17 - 22)  SpO2: 94% (12-10-20 @ 07:04) (90% - 100%)  I&O's Summary    09 Dec 2020 07:01  -  10 Dec 2020 07:00  --------------------------------------------------------  IN: 0 mL / OUT: 200 mL / NET: -200 mL    10 Dec 2020 07:01  -  10 Dec 2020 10:50  --------------------------------------------------------  IN: 180 mL / OUT: 900 mL / NET: -720 mL      -------------------------------------------------------------------------------------------  EXAM:     -------------------------------------------------------------------------------------------  LABS:                        10.7   7.14  )-----------( 185      ( 10 Dec 2020 07:17 )             33.9      12-10    138  |  106  |  39<H>  ----------------------------<  88  4.5   |  21<L>  |  1.55<H>    Ca    8.8      10 Dec 2020 07:19  Phos  4.2     12-10  Mg     2.0     12-10    TPro  6.0  /  Alb  3.2<L>  /  TBili  0.3  /  DBili  x   /  AST  16  /  ALT  28  /  AlkPhos  78  12-10     PT/INR - ( 09 Dec 2020 15:07 )   PT: 12.8 sec;   INR: 1.07 ratio         PTT - ( 09 Dec 2020 15:07 )  PTT:28.8 sec   CARDIAC MARKERS ( 09 Dec 2020 16:10 )  x     / x     / 99 U/L / x     / 6.0 ng/mL      Troponin trend:  Creatine Kinase, Serum: 99 U/L (12-09-20 @ 16:10)    -------------------------------------------------------------------------------------------  Current Meds:  aspirin enteric coated 81 milliGRAM(s) Oral daily  cholecalciferol 2000 Unit(s) Oral daily  famotidine    Tablet 20 milliGRAM(s) Oral two times a day  finasteride 5 milliGRAM(s) Oral daily  folic acid 1 milliGRAM(s) Oral daily  furosemide   Injectable 40 milliGRAM(s) IV Push daily  heparin   Injectable 5000 Unit(s) SubCutaneous every 8 hours  influenza   Vaccine 0.5 milliLiter(s) IntraMuscular once  levothyroxine 150 MICROGram(s) Oral daily  simvastatin 10 milliGRAM(s) Oral at bedtime  tamsulosin 0.8 milliGRAM(s) Oral at bedtime    ------------------------------------------------------------------------------------------- For all Cardiology service contact information, go to [email or web address] and use "[email or web address]" to login.    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 He had a echocardiogram with his previous cardiologist (now retired) 2 years ago and there was evidence of some aortic valve disease.     EP consulted for "AV block" overnight.   ECG     PAST MEDICAL & SURGICAL HISTORY:  Right bundle branch block (RBBB)    HLD (hyperlipidemia)    Melanoma in situ, unspecified    Hypothyroidism    Enlarged prostate    Hypertension    H/O total knee replacement, right    History of ankle surgery  Right ankle      SHx: smoking [ ], alcohol [ ], recreational drugs [ ]     Cardiology Data:  -------------------------------------------------------------------------------------------  ROS:  CV: chest pain (-), palpitation (-), orthopnea (-), PND (-), edema (-)  PULM: SOB (-), cough (-), wheezing (-), hemoptysis (-).   CONST: fever (-), chills (-) or fatigue (-)  GI: abdominal distension (-), abdominal pain (-) , nausea/vomiting (-), hematemesis, (-), melena (-), hematochezia (-)  : dysuria (-), frequency (-), hematuria (-).   NEURO: numbness (-), weakness (-), dizziness (-)  SKIN: itching (-), rash (-)  HEENT:  visual changes (-); vertigo or throat pain (-);  neck stiffness (-)     All other review of systems is negative unless indicated above.   -------------------------------------------------------------------------------------------  VS:  T(C): 36.5 (12-10-20 @ 05:59), Max: 36.7 (12-09-20 @ 13:24)  HR: 67 (12-10-20 @ 07:04) (58 - 74)  BP: 121/64 (12-10-20 @ 07:04) (99/42 - 153/111)  RR: 17 (12-10-20 @ 07:04) (17 - 22)  SpO2: 94% (12-10-20 @ 07:04) (90% - 100%)  I&O's Summary    09 Dec 2020 07:01  -  10 Dec 2020 07:00  --------------------------------------------------------  IN: 0 mL / OUT: 200 mL / NET: -200 mL    10 Dec 2020 07:01  -  10 Dec 2020 10:50  --------------------------------------------------------  IN: 180 mL / OUT: 900 mL / NET: -720 mL      -------------------------------------------------------------------------------------------  EXAM:     -------------------------------------------------------------------------------------------  LABS:                        10.7   7.14  )-----------( 185      ( 10 Dec 2020 07:17 )             33.9      12-10    138  |  106  |  39<H>  ----------------------------<  88  4.5   |  21<L>  |  1.55<H>    Ca    8.8      10 Dec 2020 07:19  Phos  4.2     12-10  Mg     2.0     12-10    TPro  6.0  /  Alb  3.2<L>  /  TBili  0.3  /  DBili  x   /  AST  16  /  ALT  28  /  AlkPhos  78  12-10     PT/INR - ( 09 Dec 2020 15:07 )   PT: 12.8 sec;   INR: 1.07 ratio         PTT - ( 09 Dec 2020 15:07 )  PTT:28.8 sec   CARDIAC MARKERS ( 09 Dec 2020 16:10 )  x     / x     / 99 U/L / x     / 6.0 ng/mL      Troponin trend:  Creatine Kinase, Serum: 99 U/L (12-09-20 @ 16:10)    -------------------------------------------------------------------------------------------  Current Meds:  aspirin enteric coated 81 milliGRAM(s) Oral daily  cholecalciferol 2000 Unit(s) Oral daily  famotidine    Tablet 20 milliGRAM(s) Oral two times a day  finasteride 5 milliGRAM(s) Oral daily  folic acid 1 milliGRAM(s) Oral daily  furosemide   Injectable 40 milliGRAM(s) IV Push daily  heparin   Injectable 5000 Unit(s) SubCutaneous every 8 hours  influenza   Vaccine 0.5 milliLiter(s) IntraMuscular once  levothyroxine 150 MICROGram(s) Oral daily  simvastatin 10 milliGRAM(s) Oral at bedtime  tamsulosin 0.8 milliGRAM(s) Oral at bedtime    ------------------------------------------------------------------------------------------- For all Cardiology service contact information, go to [email or web address] and use "[employer]" to login.    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 He had a echocardiogram with his previous cardiologist (now retired) 2 years ago and there was evidence of some aortic valve disease.     EP consulted for "AV block" overnight.   ECG: sinus rhythm with RBBB, 1st degree AV block  Tele: sinus bradycardia with 2:1 block likely due to Wenckebach      PAST MEDICAL & SURGICAL HISTORY:  Right bundle branch block (RBBB)    HLD (hyperlipidemia)    Melanoma in situ, unspecified    Hypothyroidism    Enlarged prostate    Hypertension    H/O total knee replacement, right    History of ankle surgery  Right ankle      SHx: smoking [ ], alcohol [ ], recreational drugs [ ]     Cardiology Data:  -------------------------------------------------------------------------------------------  ROS:  CV: chest pain (-), palpitation (-), orthopnea (-), PND (-), edema (-)  PULM: SOB (-), cough (-), wheezing (-), hemoptysis (-).   CONST: fever (-), chills (-) or fatigue (-)  GI: abdominal distension (-), abdominal pain (-) , nausea/vomiting (-), hematemesis, (-), melena (-), hematochezia (-)  : dysuria (-), frequency (-), hematuria (-).   NEURO: numbness (-), weakness (-), dizziness (-)  SKIN: itching (-), rash (-)  HEENT:  visual changes (-); vertigo or throat pain (-);  neck stiffness (-)     All other review of systems is negative unless indicated above.   -------------------------------------------------------------------------------------------  VS:  T(C): 36.5 (12-10-20 @ 05:59), Max: 36.7 (12-09-20 @ 13:24)  HR: 67 (12-10-20 @ 07:04) (58 - 74)  BP: 121/64 (12-10-20 @ 07:04) (99/42 - 153/111)  RR: 17 (12-10-20 @ 07:04) (17 - 22)  SpO2: 94% (12-10-20 @ 07:04) (90% - 100%)  I&O's Summary    09 Dec 2020 07:01  -  10 Dec 2020 07:00  --------------------------------------------------------  IN: 0 mL / OUT: 200 mL / NET: -200 mL    10 Dec 2020 07:01  -  10 Dec 2020 10:50  --------------------------------------------------------  IN: 180 mL / OUT: 900 mL / NET: -720 mL      -------------------------------------------------------------------------------------------  EXAM:     -------------------------------------------------------------------------------------------  LABS:                        10.7   7.14  )-----------( 185      ( 10 Dec 2020 07:17 )             33.9      12-10    138  |  106  |  39<H>  ----------------------------<  88  4.5   |  21<L>  |  1.55<H>    Ca    8.8      10 Dec 2020 07:19  Phos  4.2     12-10  Mg     2.0     12-10    TPro  6.0  /  Alb  3.2<L>  /  TBili  0.3  /  DBili  x   /  AST  16  /  ALT  28  /  AlkPhos  78  12-10     PT/INR - ( 09 Dec 2020 15:07 )   PT: 12.8 sec;   INR: 1.07 ratio         PTT - ( 09 Dec 2020 15:07 )  PTT:28.8 sec   CARDIAC MARKERS ( 09 Dec 2020 16:10 )  x     / x     / 99 U/L / x     / 6.0 ng/mL      Troponin trend:  Creatine Kinase, Serum: 99 U/L (12-09-20 @ 16:10)    -------------------------------------------------------------------------------------------  Current Meds:  aspirin enteric coated 81 milliGRAM(s) Oral daily  cholecalciferol 2000 Unit(s) Oral daily  famotidine    Tablet 20 milliGRAM(s) Oral two times a day  finasteride 5 milliGRAM(s) Oral daily  folic acid 1 milliGRAM(s) Oral daily  furosemide   Injectable 40 milliGRAM(s) IV Push daily  heparin   Injectable 5000 Unit(s) SubCutaneous every 8 hours  influenza   Vaccine 0.5 milliLiter(s) IntraMuscular once  levothyroxine 150 MICROGram(s) Oral daily  simvastatin 10 milliGRAM(s) Oral at bedtime  tamsulosin 0.8 milliGRAM(s) Oral at bedtime    -------------------------------------------------------------------------------------------

## 2020-12-11 LAB
ANION GAP SERPL CALC-SCNC: 11 MMOL/L — SIGNIFICANT CHANGE UP (ref 5–17)
BUN SERPL-MCNC: 45 MG/DL — HIGH (ref 7–23)
CALCIUM SERPL-MCNC: 8.8 MG/DL — SIGNIFICANT CHANGE UP (ref 8.4–10.5)
CHLORIDE SERPL-SCNC: 106 MMOL/L — SIGNIFICANT CHANGE UP (ref 96–108)
CO2 SERPL-SCNC: 24 MMOL/L — SIGNIFICANT CHANGE UP (ref 22–31)
CREAT SERPL-MCNC: 1.73 MG/DL — HIGH (ref 0.5–1.3)
GLUCOSE SERPL-MCNC: 119 MG/DL — HIGH (ref 70–99)
HCT VFR BLD CALC: 34.1 % — LOW (ref 39–50)
HGB BLD-MCNC: 11.1 G/DL — LOW (ref 13–17)
MAGNESIUM SERPL-MCNC: 2 MG/DL — SIGNIFICANT CHANGE UP (ref 1.6–2.6)
MCHC RBC-ENTMCNC: 30.4 PG — SIGNIFICANT CHANGE UP (ref 27–34)
MCHC RBC-ENTMCNC: 32.6 GM/DL — SIGNIFICANT CHANGE UP (ref 32–36)
MCV RBC AUTO: 93.4 FL — SIGNIFICANT CHANGE UP (ref 80–100)
NRBC # BLD: 0 /100 WBCS — SIGNIFICANT CHANGE UP (ref 0–0)
PLATELET # BLD AUTO: 197 K/UL — SIGNIFICANT CHANGE UP (ref 150–400)
POTASSIUM SERPL-MCNC: 4.5 MMOL/L — SIGNIFICANT CHANGE UP (ref 3.5–5.3)
POTASSIUM SERPL-SCNC: 4.5 MMOL/L — SIGNIFICANT CHANGE UP (ref 3.5–5.3)
RBC # BLD: 3.65 M/UL — LOW (ref 4.2–5.8)
RBC # FLD: 13.1 % — SIGNIFICANT CHANGE UP (ref 10.3–14.5)
SODIUM SERPL-SCNC: 141 MMOL/L — SIGNIFICANT CHANGE UP (ref 135–145)
WBC # BLD: 7.35 K/UL — SIGNIFICANT CHANGE UP (ref 3.8–10.5)
WBC # FLD AUTO: 7.35 K/UL — SIGNIFICANT CHANGE UP (ref 3.8–10.5)

## 2020-12-11 PROCEDURE — 93306 TTE W/DOPPLER COMPLETE: CPT | Mod: 26

## 2020-12-11 PROCEDURE — 99223 1ST HOSP IP/OBS HIGH 75: CPT

## 2020-12-11 RX ORDER — FUROSEMIDE 40 MG
40 TABLET ORAL DAILY
Refills: 0 | Status: DISCONTINUED | OUTPATIENT
Start: 2020-12-12 | End: 2020-12-12

## 2020-12-11 RX ADMIN — HEPARIN SODIUM 5000 UNIT(S): 5000 INJECTION INTRAVENOUS; SUBCUTANEOUS at 05:28

## 2020-12-11 RX ADMIN — Medication 1 MILLIGRAM(S): at 12:14

## 2020-12-11 RX ADMIN — TAMSULOSIN HYDROCHLORIDE 0.8 MILLIGRAM(S): 0.4 CAPSULE ORAL at 21:55

## 2020-12-11 RX ADMIN — HEPARIN SODIUM 5000 UNIT(S): 5000 INJECTION INTRAVENOUS; SUBCUTANEOUS at 21:55

## 2020-12-11 RX ADMIN — SIMVASTATIN 10 MILLIGRAM(S): 20 TABLET, FILM COATED ORAL at 21:55

## 2020-12-11 RX ADMIN — FAMOTIDINE 20 MILLIGRAM(S): 10 INJECTION INTRAVENOUS at 21:55

## 2020-12-11 RX ADMIN — Medication 40 MILLIGRAM(S): at 05:29

## 2020-12-11 RX ADMIN — HEPARIN SODIUM 5000 UNIT(S): 5000 INJECTION INTRAVENOUS; SUBCUTANEOUS at 12:14

## 2020-12-11 RX ADMIN — Medication 150 MICROGRAM(S): at 05:28

## 2020-12-11 RX ADMIN — Medication 2000 UNIT(S): at 12:14

## 2020-12-11 RX ADMIN — FINASTERIDE 5 MILLIGRAM(S): 5 TABLET, FILM COATED ORAL at 12:14

## 2020-12-11 RX ADMIN — FAMOTIDINE 20 MILLIGRAM(S): 10 INJECTION INTRAVENOUS at 05:28

## 2020-12-11 NOTE — CONSULT NOTE ADULT - SUBJECTIVE AND OBJECTIVE BOX
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 reports past 2 days with minimal activity along exhaustion. no fever no sob no travel hx. No calf pain or leg swelling. Former smoker. No cardiac hx.   Patient has hxx of heart murmur. He improved withlasix and presently is no longer short of breath  echo compatible with severe aortic stenosis  PAST MEDICAL & SURGICAL HISTORY:  Right bundle branch block (RBBB)    HLD (hyperlipidemia)    Melanoma in situ, unspecified    Hypothyroidism    Enlarged prostate    Hypertension    H/O total knee replacement, right    History of ankle surgery  Right ankle            MEDICATIONS:  aspirin enteric coated 81 milliGRAM(s) Oral daily  heparin   Injectable 5000 Unit(s) SubCutaneous every 8 hours  tamsulosin 0.8 milliGRAM(s) Oral at bedtime          famotidine    Tablet 20 milliGRAM(s) Oral two times a day    finasteride 5 milliGRAM(s) Oral daily  levothyroxine 150 MICROGram(s) Oral daily  simvastatin 10 milliGRAM(s) Oral at bedtime    cholecalciferol 2000 Unit(s) Oral daily  folic acid 1 milliGRAM(s) Oral daily  influenza   Vaccine 0.5 milliLiter(s) IntraMuscular once      FAMILY HISTORY:  FH: heart disease  Mother        Allergies    No Known Allergies    Intolerances    PHYSICAL EXAM:  T(C): 36.6 (12-11-20 @ 11:56), Max: 36.7 (12-10-20 @ 21:12)  HR: 68 (12-11-20 @ 11:56) (68 - 84)  BP: 125/74 (12-11-20 @ 11:56) (113/61 - 137/75)  RR: 18 (12-11-20 @ 11:56) (17 - 18)  SpO2: 92% (12-11-20 @ 11:56) (92% - 96%)  Wt(kg): --  I&O's Summary    10 Dec 2020 07:01  -  11 Dec 2020 07:00  --------------------------------------------------------  IN: 840 mL / OUT: 900 mL / NET: -60 mL    11 Dec 2020 07:01  -  11 Dec 2020 16:56  --------------------------------------------------------  IN: 540 mL / OUT: 0 mL / NET: 540 mL        Appearance: quite intact looks young than state age	  HEENT:   Normal oral mucosa, PERRL, EOMI	  Cardiovascular: Normal S1 S2, 2-3/6 MALIA late peaking decreased A2 nod aistolic murmur  Respiratory: Lungs clear to auscultation	  Psychiatry: A & O x 3, Mood & affect appropriate  Gastrointestinal:  Soft, Non-tender, + BS	  Skin: No rashes, No ecchymoses, No cyanosis	  Neurologic: Non-focal oriented x 3 spry looks younger than state age  Extremities: No clubbing, cyanosis or edema      TELEMETRY: 	    ECG: NSR RBBB 	    LABS:	 	    CARDIAC MARKERS:                                  11.1   7.35  )-----------( 197      ( 11 Dec 2020 04:56 )             34.1     12-11    141  |  106  |  45<H>  ----------------------------<  119<H>  4.5   |  24  |  1.73<H>    Ca    8.8      11 Dec 2020 04:56  Phos  4.2     12-10  Mg     2.0     12-11    TPro  6.0  /  Alb  3.2<L>  /  TBili  0.3  /  DBili  x   /  AST  16  /  ALT  28  /  AlkPhos  78  12-10    proBNP: Serum Pro-Brain Natriuretic Peptide: 05781 pg/mL (12-09 @ 15:07)    Lipid Profile:   HgA1c:   TSH:     < from: Transthoracic Echocardiogram (12.11.20 @ 13:45) >  Patient name: ANASTASIA SAMAYOA  YOB: 1923   Age: 89 (M)   MR#: [id number]  Study Date: 12/11/2020  [facility]  Study quality: Technically good  Referring Physician: Odalis Aiken MD  Blood Pressure: 123/74 mmHg  Height: 168 cm  Weight: 82 kg  BSA: 1.9 m2  ------------------------------------------------------------------------  PROCEDURE: Transthoracic echocardiogram with 2-D, M-Mode  and complete spectral and color flow Doppler.  INDICATION: Cardiomyopathy, unspecified (I42.9)  ------------------------------------------------------------------------  Dimensions:    Normal Values:  LA:     5.4    2.0 - 4.0 cm  Ao:     3.8    2.0 - 3.8 cm  SEPTUM: 1.3    0.6 - 1.2 cm  PWT:    1.2   0.6 - 1.1 cm  LVIDd:  4.9    3.0 - 5.6 cm  LVIDs:  3.9    1.8 - 4.0 cm  Derived variables:  LVMI: 126 g/m2  RWT: 0.48  EF (Visual Estimate): 55 %  Doppler Peak Velocity (m/sec): MV=1.7 AoV=4.1  ------------------------------------------------------------------------  Observations:  Mitral Valve: Mild mitral stenosis due to mitral annular,  chordal, and leaflet calcification.  Mean gradient 3.1 mmHg at  85/min.  Minimal mitral regurgitation.  Aortic Valve/Aorta: Severe aortic stenosis.  ---Aortic TVI 97.7 cm. Mean gradient 46 mmHg.  ---LVOTd 2.3 cm. LVOT TVI 13.8 cm.  ---Aortic valve area by continuity 0.6 cm2.  Mild aortic regurgitation.  Normal aortic root size.  Left Atrium: Severely dilated left atrium.  Left Ventricle: Mild concentric left ventricular  hypertrophy. Discrete basal septal hypertrophy.  Estiamted ejection fraction 55%.  The basal anterolateral wall and inferolateral wall appear  hypokinetic.  Right Heart: Normal right atrium. Normal right ventricular  size and function.  Normal tricuspid valve.  Normal pulmonic valve. Minimal pulmonic regurgitation.  Pericardium/Pleura: Normal pericardium with no pericardial  effusion.  Hemodynamic: No evidence of pulmonary hypertension.  ------------------------------------------------------------------------  Conclusions:  [facility]  Severe aortic stenosis. Mild mitral stenosis due to mitral  annular, chordal, and leaflet calcification.  ------------------------------------------------------------------------  Confirmed on  12/11/2020 - 16:10:10 by Carlos Springer MD, FASE  ------------------------------------------------------------------------

## 2020-12-11 NOTE — PROGRESS NOTE ADULT - ASSESSMENT
Dyspnea  - now improved with diuresis  ACute exacerbation CHF - TTE pending  Small bilateral pleural effusions  Hypertension    REC    Diuretic managment per cardiology/primary team  TTE pending  Monitor resp status

## 2020-12-11 NOTE — CONSULT NOTE ADULT - ASSESSMENT
1. severe aortic stenosis  2. recent sob chf responded to IV lasix  3. RBBB  4. CRI    Presently patient is hemodynamically stable in no CHF with stable VS.    Recommend  discharge home  followup with me in office for discussion of how aggressive he wants to be at age 89 to proceed with TAVR vs continued medical therapy

## 2020-12-11 NOTE — PROGRESS NOTE ADULT - SUBJECTIVE AND OBJECTIVE BOX
Follow-up Pulm Progress Note  Luis Castro MD  [phone]    AFebrile  LE dopplers negative DVT  TTE pending  Feels well: denied dyspnea, says breathing improved    Medications:  Vital Signs Last 24 Hrs  T(C): 36.5 (11 Dec 2020 10:13), Max: 36.7 (10 Dec 2020 21:12)  T(F): 97.7 (11 Dec 2020 10:13), Max: 98.1 (10 Dec 2020 21:12)  HR: 83 (11 Dec 2020 10:13) (73 - 84)  BP: 122/54 (11 Dec 2020 10:13) (113/61 - 137/75)  BP(mean): --  RR: 18 (11 Dec 2020 10:13) (17 - 18)  SpO2: 94% (11 Dec 2020 10:13) (93% - 96%)      12-10 @ 07:01  -  12-11 @ 07:00  --------------------------------------------------------  IN: 840 mL / OUT: 900 mL / NET: -60 mL        LABS:                        11.1   7.35  )-----------( 197      ( 11 Dec 2020 04:56 )             34.1     12-11    141  |  106  |  45<H>  ----------------------------<  119<H>  4.5   |  24  |  1.73<H>    Ca    8.8      11 Dec 2020 04:56  Phos  4.2     12-10  Mg     2.0     12-11    TPro  6.0  /  Alb  3.2<L>  /  TBili  0.3  /  DBili  x   /  AST  16  /  ALT  28  /  AlkPhos  78  12-10      PT/INR - ( 09 Dec 2020 15:07 )   PT: 12.8 sec;   INR: 1.07 ratio         PTT - ( 09 Dec 2020 15:07 )  PTT:28.8 sec    Serum Pro-Brain Natriuretic Peptide: 08587 pg/mL (12-09-20 @ 15:07)      Physical Examination:  PULM: No crackles, wheeze,or rhonchi  CVS: Regular rate and rhythm, no murmurs, rubs, or gallops  ABD: Soft, non-tender  EXT:  No clubbing, cyanosis, or edema    RADIOLOGY REVIEWED  CXR:    CT chest:    TTE:

## 2020-12-11 NOTE — PROGRESS NOTE ADULT - ASSESSMENT
89yom pmhx HTN HLD hx of Melanoma here with week of progressively worsening sob and urrutia. reports past 2 days with minimal activity along exhaustion. no fever no sob no travel hx. No calf pain or leg swelling. Former smoker. No cardiac hx.    Problem/Plan - 1:  ·  Problem: Pulmonary edema.  Plan: acute chf exacerbation  cards fu  check echo  diuresis as per cards.     Problem/Plan - 2:  ·  Problem: Hypothyroidism.  Plan: cw synthyroid.     Problem/Plan - 3:  ·  Problem: Hypertension.  Plan: cw home meds  DASH diet.     dc planning if OK with cards

## 2020-12-11 NOTE — PROGRESS NOTE ADULT - SUBJECTIVE AND OBJECTIVE BOX
Patient is a 89y old  Male who presents with a chief complaint of sob (11 Dec 2020 11:48)      INTERVAL HPI/OVERNIGHT EVENTS:  T(C): 36.6 (12-11-20 @ 11:56), Max: 36.7 (12-10-20 @ 21:12)  HR: 68 (12-11-20 @ 11:56) (68 - 84)  BP: 125/74 (12-11-20 @ 11:56) (113/61 - 137/75)  RR: 18 (12-11-20 @ 11:56) (17 - 18)  SpO2: 92% (12-11-20 @ 11:56) (92% - 96%)  Wt(kg): --  I&O's Summary    10 Dec 2020 07:01  -  11 Dec 2020 07:00  --------------------------------------------------------  IN: 840 mL / OUT: 900 mL / NET: -60 mL    11 Dec 2020 07:01  -  11 Dec 2020 16:57  --------------------------------------------------------  IN: 540 mL / OUT: 0 mL / NET: 540 mL        LABS:                        11.1   7.35  )-----------( 197      ( 11 Dec 2020 04:56 )             34.1     12-11    141  |  106  |  45<H>  ----------------------------<  119<H>  4.5   |  24  |  1.73<H>    Ca    8.8      11 Dec 2020 04:56  Phos  4.2     12-10  Mg     2.0     12-11    TPro  6.0  /  Alb  3.2<L>  /  TBili  0.3  /  DBili  x   /  AST  16  /  ALT  28  /  AlkPhos  78  12-10        CAPILLARY BLOOD GLUCOSE                MEDICATIONS  (STANDING):  aspirin enteric coated 81 milliGRAM(s) Oral daily  cholecalciferol 2000 Unit(s) Oral daily  famotidine    Tablet 20 milliGRAM(s) Oral two times a day  finasteride 5 milliGRAM(s) Oral daily  folic acid 1 milliGRAM(s) Oral daily  heparin   Injectable 5000 Unit(s) SubCutaneous every 8 hours  influenza   Vaccine 0.5 milliLiter(s) IntraMuscular once  levothyroxine 150 MICROGram(s) Oral daily  simvastatin 10 milliGRAM(s) Oral at bedtime  tamsulosin 0.8 milliGRAM(s) Oral at bedtime    MEDICATIONS  (PRN):          PHYSICAL EXAM:  GENERAL: frail  CHEST/LUNG: Clear to percussion bilaterally; No rales, rhonchi, wheezing, or rubs  HEART: Regular rate and rhythm; No murmurs, rubs, or gallops  ABDOMEN: Soft, Nontender, Nondistended; Bowel sounds present  EXTREMITIES:  2+ Peripheral Pulses, No clubbing, cyanosis, or edema  LYMPH: No lymphadenopathy noted  SKIN: No rashes or lesions    Care Discussed with Consultants/Other Providers [x ] YES  [ ] NO

## 2020-12-12 ENCOUNTER — TRANSCRIPTION ENCOUNTER (OUTPATIENT)
Age: 85
End: 2020-12-12

## 2020-12-12 VITALS
OXYGEN SATURATION: 92 % | SYSTOLIC BLOOD PRESSURE: 124 MMHG | HEART RATE: 78 BPM | RESPIRATION RATE: 18 BRPM | TEMPERATURE: 97 F | DIASTOLIC BLOOD PRESSURE: 69 MMHG

## 2020-12-12 LAB
ANION GAP SERPL CALC-SCNC: 11 MMOL/L — SIGNIFICANT CHANGE UP (ref 5–17)
BUN SERPL-MCNC: 46 MG/DL — HIGH (ref 7–23)
CALCIUM SERPL-MCNC: 8.8 MG/DL — SIGNIFICANT CHANGE UP (ref 8.4–10.5)
CHLORIDE SERPL-SCNC: 108 MMOL/L — SIGNIFICANT CHANGE UP (ref 96–108)
CO2 SERPL-SCNC: 23 MMOL/L — SIGNIFICANT CHANGE UP (ref 22–31)
CREAT SERPL-MCNC: 1.7 MG/DL — HIGH (ref 0.5–1.3)
GLUCOSE SERPL-MCNC: 112 MG/DL — HIGH (ref 70–99)
MAGNESIUM SERPL-MCNC: 1.9 MG/DL — SIGNIFICANT CHANGE UP (ref 1.6–2.6)
PHOSPHATE SERPL-MCNC: 4.2 MG/DL — SIGNIFICANT CHANGE UP (ref 2.5–4.5)
POTASSIUM SERPL-MCNC: 4.4 MMOL/L — SIGNIFICANT CHANGE UP (ref 3.5–5.3)
POTASSIUM SERPL-SCNC: 4.4 MMOL/L — SIGNIFICANT CHANGE UP (ref 3.5–5.3)
SODIUM SERPL-SCNC: 142 MMOL/L — SIGNIFICANT CHANGE UP (ref 135–145)

## 2020-12-12 PROCEDURE — U0003: CPT

## 2020-12-12 PROCEDURE — 80053 COMPREHEN METABOLIC PANEL: CPT

## 2020-12-12 PROCEDURE — 85730 THROMBOPLASTIN TIME PARTIAL: CPT

## 2020-12-12 PROCEDURE — 97161 PT EVAL LOW COMPLEX 20 MIN: CPT

## 2020-12-12 PROCEDURE — 93306 TTE W/DOPPLER COMPLETE: CPT

## 2020-12-12 PROCEDURE — 82550 ASSAY OF CK (CPK): CPT

## 2020-12-12 PROCEDURE — 82553 CREATINE MB FRACTION: CPT

## 2020-12-12 PROCEDURE — 93005 ELECTROCARDIOGRAM TRACING: CPT

## 2020-12-12 PROCEDURE — 86769 SARS-COV-2 COVID-19 ANTIBODY: CPT

## 2020-12-12 PROCEDURE — 71045 X-RAY EXAM CHEST 1 VIEW: CPT

## 2020-12-12 PROCEDURE — 80048 BASIC METABOLIC PNL TOTAL CA: CPT

## 2020-12-12 PROCEDURE — 83735 ASSAY OF MAGNESIUM: CPT

## 2020-12-12 PROCEDURE — 85379 FIBRIN DEGRADATION QUANT: CPT

## 2020-12-12 PROCEDURE — 85027 COMPLETE CBC AUTOMATED: CPT

## 2020-12-12 PROCEDURE — 99285 EMERGENCY DEPT VISIT HI MDM: CPT

## 2020-12-12 PROCEDURE — 85025 COMPLETE CBC W/AUTO DIFF WBC: CPT

## 2020-12-12 PROCEDURE — 84100 ASSAY OF PHOSPHORUS: CPT

## 2020-12-12 PROCEDURE — 83880 ASSAY OF NATRIURETIC PEPTIDE: CPT

## 2020-12-12 PROCEDURE — 71275 CT ANGIOGRAPHY CHEST: CPT

## 2020-12-12 PROCEDURE — 93970 EXTREMITY STUDY: CPT

## 2020-12-12 PROCEDURE — 84484 ASSAY OF TROPONIN QUANT: CPT

## 2020-12-12 PROCEDURE — 85610 PROTHROMBIN TIME: CPT

## 2020-12-12 RX ORDER — FUROSEMIDE 40 MG
1 TABLET ORAL
Qty: 30 | Refills: 0
Start: 2020-12-12 | End: 2021-01-10

## 2020-12-12 RX ORDER — METOPROLOL TARTRATE 50 MG
1 TABLET ORAL
Qty: 0 | Refills: 0 | DISCHARGE

## 2020-12-12 RX ADMIN — Medication 40 MILLIGRAM(S): at 05:57

## 2020-12-12 RX ADMIN — Medication 150 MICROGRAM(S): at 05:57

## 2020-12-12 RX ADMIN — HEPARIN SODIUM 5000 UNIT(S): 5000 INJECTION INTRAVENOUS; SUBCUTANEOUS at 05:57

## 2020-12-12 RX ADMIN — HEPARIN SODIUM 5000 UNIT(S): 5000 INJECTION INTRAVENOUS; SUBCUTANEOUS at 15:06

## 2020-12-12 RX ADMIN — Medication 1 MILLIGRAM(S): at 10:26

## 2020-12-12 RX ADMIN — FAMOTIDINE 20 MILLIGRAM(S): 10 INJECTION INTRAVENOUS at 05:57

## 2020-12-12 RX ADMIN — FINASTERIDE 5 MILLIGRAM(S): 5 TABLET, FILM COATED ORAL at 10:26

## 2020-12-12 RX ADMIN — Medication 2000 UNIT(S): at 10:27

## 2020-12-12 NOTE — PHYSICAL THERAPY INITIAL EVALUATION ADULT - PERTINENT HX OF CURRENT PROBLEM, REHAB EVAL
89yom pmhx HTN HLD hx of Melanoma here with week of progressively worsening sob and urrutia. reports past 2 days with minimal activity along exhaustion. no fever no sob no travel hx. No calf pain or leg swelling. Former smoker. No cardiac hx. Pt referred to PT for a functional assessment.

## 2020-12-12 NOTE — PROVIDER CONTACT NOTE (OTHER) - ASSESSMENT
A&O x4.  Pt was in deep sleep during time of event; denied pain/discomfort.  Temp 97.8, HR 91 1st degree NSR, /74, RR 18, 94% O2 saturation on room air.  Patient's HR previously dropped to 25 and also had 4-second pause in past

## 2020-12-12 NOTE — DISCHARGE NOTE NURSING/CASE MANAGEMENT/SOCIAL WORK - PATIENT PORTAL LINK FT
You can access the FollowMyHealth Patient Portal offered by [facility] by registering at the following website: [email or web address]. By joining [employer]’s FollowMyHealth portal, you will also be able to view your health information using other applications (apps) compatible with our system.

## 2020-12-12 NOTE — CHART NOTE - NSCHARTNOTEFT_GEN_A_CORE
Medicine PA Episodic Note    Notified by RN that patient had episode of bradycardia to 29 while on telemetry. Patient also noted to have 3.4 second pause afterward. Patient noted to have Mobitz Type 2 while on telemetry. Patient converted back to sinus w/first degree heartblock. VSS were stable. Patient noted to have bradycardia w/HR 25, while on telemetry and sinus pause to 4 seconds previously. Patient is usually Sinus w/first degree heart block w/HR between 70s -90s.     Patient was asleep during episode. Patient was asymptomatic during episode. Patient denies chest pain, headache, fever, chills, nausea, vomiting, chills. lightheadedness.        Vital Signs Last 24 Hrs  T(C): 36.6 (12 Dec 2020 01:17), Max: 36.6 (11 Dec 2020 11:56)  T(F): 97.8 (12 Dec 2020 01:17), Max: 97.9 (11 Dec 2020 11:56)  HR: 91 (12 Dec 2020 01:17) (68 - 91)  BP: 161/74 (12 Dec 2020 01:17) (113/61 - 161/74)  BP(mean): --  RR: 18 (12 Dec 2020 01:17) (18 - 18)  SpO2: 94% (12 Dec 2020 01:17) (92% - 96%)      Labs:                          11.1   7.35  )-----------( 197      ( 11 Dec 2020 04:56 )             34.1     12-11    141  |  106  |  45<H>  ----------------------------<  119<H>  4.5   |  24  |  1.73<H>    Ca    8.8      11 Dec 2020 04:56  Phos  4.2     12-10  Mg     2.0     12-11    TPro  6.0  /  Alb  3.2<L>  /  TBili  0.3  /  DBili  x   /  AST  16  /  ALT  28  /  AlkPhos  78  12-10    Radiology:  < from: Transthoracic Echocardiogram (12.11.20 @ 13:45) >    Conclusions:  Estiamted ejection fraction 55%.  Severe aortic stenosis. Mild mitral stenosis due to mitral  annular, chordal, and leaflet calcification.    < end of copied text >    Physical Exam:  General: WN/WD NAD  Neurology: A&Ox3, nonfocal, ARELLANO x 4  Head:  Normocephalic, atraumatic  Respiratory: CTA B/L  CV: RRR, S1S2, no murmur  Abdominal: Soft, NT, ND no palpable mass  MSK: No edema, + peripheral pulses, FROM all 4 extremity    Assessment & Plan:  HPI:  89yom pmhx HTN HLD hx of Melanoma here with week of progressively worsening sob and urrutia. reports past 2 days with minimal activity along exhaustion. no fever no sob no travel hx. No calf pain or leg swelling. Former smoker. No cardiac hx.    Sinus bradycardia - 29 HR w/transient episode of Mobitz Type Type 2.   Plan:  #Bradycardia.   - EKG ordered; patient w/no acute changes in EKG. EKG showing SR w/first degree heart block HR - 81 w/known RBBB.   - Patient currently not on any BB.   - R2 pads at bedside.   - Lytes ordered for AM.   - Cardiology following.   - EP consulted prior, signed off on case as patient is asymptomatic w/no immediate need for intervention.   - Will continue to monitor closely on telemetry.       Endorse to AM team.   Follow up with Attending in AM.  Kamille OSHEA  Dept of Medicine  [id number] Medicine PA Episodic Note    Notified by RN that patient had episode of bradycardia to 29 while on telemetry. Patient also noted to have 3.4 second pause afterward. Patient noted to have Mobitz Type 2 while on telemetry. Patient seen and assessed by me. Patient endorses that he was asleep during episode and 'did not feel a thing'.  Patient converted back to sinus w/first degree heartblock. VSS were stable. On this admission, patient was noted to have bradycardia w/HR 25, while on telemetry and sinus pause to 4 seconds previously. Patient is usually Sinus w/first degree heart block w/HR between 70s -90s.    Patient was asleep during episode. Patient was asymptomatic during episode. Patient denies chest pain, headache, fever, chills, nausea, vomiting, chills. lightheadedness.        Vital Signs Last 24 Hrs  T(C): 36.6 (12 Dec 2020 01:17), Max: 36.6 (11 Dec 2020 11:56)  T(F): 97.8 (12 Dec 2020 01:17), Max: 97.9 (11 Dec 2020 11:56)  HR: 91 (12 Dec 2020 01:17) (68 - 91)  BP: 161/74 (12 Dec 2020 01:17) (113/61 - 161/74)  BP(mean): --  RR: 18 (12 Dec 2020 01:17) (18 - 18)  SpO2: 94% (12 Dec 2020 01:17) (92% - 96%)      Labs:                          11.1   7.35  )-----------( 197      ( 11 Dec 2020 04:56 )             34.1     12-11    141  |  106  |  45<H>  ----------------------------<  119<H>  4.5   |  24  |  1.73<H>    Ca    8.8      11 Dec 2020 04:56  Phos  4.2     12-10  Mg     2.0     12-11    TPro  6.0  /  Alb  3.2<L>  /  TBili  0.3  /  DBili  x   /  AST  16  /  ALT  28  /  AlkPhos  78  12-10    Radiology:  < from: Transthoracic Echocardiogram (12.11.20 @ 13:45) >    Conclusions:  Estiamted ejection fraction 55%.  Severe aortic stenosis. Mild mitral stenosis due to mitral  annular, chordal, and leaflet calcification.    < end of copied text >    Physical Exam:  General: WN/WD NAD  Neurology: A&Ox3, nonfocal, ARELLANO x 4  Head:  Normocephalic, atraumatic  Respiratory: CTA B/L  CV: RRR, S1S2, no murmur  Abdominal: Soft, NT, ND no palpable mass  MSK: No edema, + peripheral pulses, FROM all 4 extremity    Assessment & Plan:  HPI:  89yom pmhx HTN HLD hx of Melanoma here with week of progressively worsening sob and urrutia. reports past 2 days with minimal activity along exhaustion. no fever no sob no travel hx. No calf pain or leg swelling. Former smoker. No cardiac hx.    Sinus bradycardia - 29 HR w/transient episode of Mobitz Type Type 2.   Plan:  #Bradycardia.   - EKG ordered; patient w/no acute changes in EKG. EKG showing SR w/first degree heart block HR - 81 w/known RBBB.   - Patient currently not on any BB.   - R2 pads at bedside.   - Lytes ordered for AM.   - Cardiology following.   - C/w tele monitoring.    - EP consulted prior, signed off on case as patient is asymptomatic w/no immediate need for intervention.   - Will continue to monitor closely on telemetry.       Endorse to AM team.   Follow up with Attending in AM.  Kamille OSHEA  Dept of Medicine  [id number]      -------------- ADDENDUM  6:50 AM     ICU Vital Signs Last 24 Hrs  T(C): 36.7 (12 Dec 2020 04:56), Max: 36.7 (12 Dec 2020 04:56)  T(F): 98 (12 Dec 2020 04:56), Max: 98 (12 Dec 2020 04:56)  HR: 80 (12 Dec 2020 04:56) (68 - 91)  BP: 129/69 (12 Dec 2020 04:56) (122/54 - 161/74)  BP(mean): --  ABP: --  ABP(mean): --  RR: 18 (12 Dec 2020 04:56) (18 - 18)  SpO2: 93% (12 Dec 2020 04:56) (92% - 94%)    Patient noted to have 9 beats of WCT. Patient seen and assessed by me.   Patient currently not on any BB given intermittent episodes of bradycardia and Mobitz.   Patient noted to have this episode during blood draw by PCA. Patient denies acute symptoms, chest pain, headache, fever, chills.   Will continue to monitor closely. F/u lytes in AM.   Awaiting callback from cardiology attending Dr. Isidro.       [id number]

## 2020-12-12 NOTE — DISCHARGE NOTE PROVIDER - NSDCMRMEDTOKEN_GEN_ALL_CORE_FT
aspirin 81 mg oral delayed release tablet: 1 tab(s) orally once a week on monday  famotidine 40 mg oral tablet: 1 tab(s) orally once a day (at bedtime)  finasteride 5 mg oral tablet: 1 tab(s) orally once a day  Flomax 0.4 mg oral capsule: 2 cap(s) orally once a day (at bedtime)  folic acid 0.8 mg oral tablet: 1 tab(s) orally once a day  furosemide 40 mg oral tablet: 1 tab(s) orally once a day  levothyroxine 150 mcg (0.15 mg) oral tablet: 1 tab(s) orally once a day  simvastatin 10 mg oral tablet: 1 tab(s) orally once a day (at bedtime)  Vitamin D3 1000 intl units oral tablet: 1 tab(s) orally once a day

## 2020-12-12 NOTE — DISCHARGE NOTE PROVIDER - CARE PROVIDER_API CALL
Kian Isidro (MD)  Cardiovascular Disease; Internal Medicine; Interventional Cardiology  [address] [place] [address]  Phone: [phone]  Fax: [phone]  Follow Up Time: 1-3 days

## 2020-12-12 NOTE — PROVIDER CONTACT NOTE (OTHER) - BACKGROUND
Patient admitted for increased SOB, heart failure, heart block.  PMH of RBBB, HLD, HTN, hypothyroidism

## 2020-12-12 NOTE — PROGRESS NOTE ADULT - ASSESSMENT
Dyspnea  - now improved with diuresis  ACute exacerbation CHF - Severe AS, preserved LV systolic function  Small bilateral pleural effusions  Hypertension    REC    Diuretic managment per cardiology/primary team  Cardiology f/u  Monitor resp status

## 2020-12-12 NOTE — PHYSICAL THERAPY INITIAL EVALUATION ADULT - GAIT TRAINING, PT EVAL
GOAL: Patient will ambulate 300 feet independently in 1 week. Pt will be able to neg 6 steps w/ 1 HR and sup in 1 week.

## 2020-12-12 NOTE — DISCHARGE NOTE PROVIDER - HOSPITAL COURSE
89yom pmhx HTN HLD hx of Melanoma here with week of progressively worsening sob and urrutia. reports past 2 days with minimal activity along exhaustion. no fever no sob no travel hx. No calf pain or leg swelling. Former smoker. No cardiac hx. CTA with no PE, bilateral pleural effusions and LLL GGO ? fibrosis; Admitted with CHF exacerbation. Rxed with  lasix IV daily; diuresed well; developed chen on CKD; lasix reduced to PO , creatinine stable . ECHO with severe AS. cardiology to follow up outpt to discuss rx modalities. Had bradycardia with mobitz 2 block while sleeping. EPS consulted. since asymptomatic , no intervention planned; EPS signed OFF. Cardiology cleared discharge. Discharged home. Refused home care.

## 2020-12-12 NOTE — CHART NOTE - NSCHARTNOTEFT_GEN_A_CORE
Pt with episode of bradycardia to 29 with mobitz 2 block while sleeping; A soon as pt was awoken HR increased to 70. Pt with similar episode last night; spoke to EPS, since no symptoms, advised no intervention and   No immediate indication for cardiac pacing.  ATtending notified; Cardiology notified; Pt cleared for discharge by cardiology and EOS; Discharged home.

## 2020-12-12 NOTE — PROVIDER CONTACT NOTE (OTHER) - ACTION/TREATMENT ORDERED:
Add Mg and Phos to AM labs,Will continue to monitor
Will continue to monitor
Will continue to monitor.
DAYO Egan aware.  STAT EKG ordered and R2 pads to be placed at the bedside. Will continue to monitor pt.
PA notified and aware.  Already off beta-blockers. EKG ordered. Continue to monitor pt and maintain safety

## 2020-12-12 NOTE — PHYSICAL THERAPY INITIAL EVALUATION ADULT - CRITERIA FOR SKILLED THERAPEUTIC INTERVENTIONS
impairments found/therapy frequency/functional limitations in following categories/anticipated discharge recommendation/anticipated equipment needs at discharge/risk reduction/prevention

## 2020-12-12 NOTE — PHYSICAL THERAPY INITIAL EVALUATION ADULT - ADDITIONAL COMMENTS
Pt lives in a pvt house alone w/ 3 steps to neg to enter. Pt amb I PTA but has a RW at home from a previous surgery.

## 2020-12-12 NOTE — DISCHARGE NOTE PROVIDER - NSDCCPCAREPLAN_GEN_ALL_CORE_FT
PRINCIPAL DISCHARGE DIAGNOSIS  Diagnosis: Acute decompensated heart failure  Assessment and Plan of Treatment: Weigh yourself daily.  If you gain 3lbs in 3 days, or 5lbs in a week call your Health Care Provider.  Do not eat or drink foods containing more than 2000mg of salt (sodium) in your diet every day.  Call your Health Care Provider if you have any swelling or increased swelling in your feet, ankles, and/or stomach.  Take all of your medication as directed.  If you become dizzy call your Health Care Provider.        SECONDARY DISCHARGE DIAGNOSES  Diagnosis: Severe aortic stenosis  Assessment and Plan of Treatment: Follow up with Dr Isidro early next week    Diagnosis: Bradycardia, unspecified  Assessment and Plan of Treatment: You were seen by electrophysiologist  No intervention needed as no symptoms associated

## 2020-12-14 PROBLEM — I45.10 UNSPECIFIED RIGHT BUNDLE-BRANCH BLOCK: Chronic | Status: ACTIVE | Noted: 2020-12-09

## 2020-12-14 PROBLEM — E78.5 HYPERLIPIDEMIA, UNSPECIFIED: Chronic | Status: ACTIVE | Noted: 2020-12-09

## 2020-12-14 NOTE — DISCHARGE NOTE PROVIDER - NSDCQMPCI_CARD_ALL_CORE
Complex Repair Preamble Text (Leave Blank If You Do Not Want): Extensive wide undermining was performed. No

## 2020-12-15 ENCOUNTER — APPOINTMENT (OUTPATIENT)
Dept: CARDIOLOGY | Facility: CLINIC | Age: 85
End: 2020-12-15
Payer: MEDICARE

## 2020-12-15 ENCOUNTER — NON-APPOINTMENT (OUTPATIENT)
Age: 85
End: 2020-12-15

## 2020-12-15 VITALS
HEIGHT: 66 IN | SYSTOLIC BLOOD PRESSURE: 142 MMHG | DIASTOLIC BLOOD PRESSURE: 80 MMHG | BODY MASS INDEX: 27 KG/M2 | HEART RATE: 93 BPM | WEIGHT: 168 LBS | TEMPERATURE: 96.6 F | OXYGEN SATURATION: 95 %

## 2020-12-15 PROCEDURE — 93000 ELECTROCARDIOGRAM COMPLETE: CPT

## 2020-12-15 PROCEDURE — 99213 OFFICE O/P EST LOW 20 MIN: CPT

## 2020-12-15 RX ORDER — ENALAPRIL MALEATE 20 MG/1
20 TABLET ORAL
Refills: 0 | Status: DISCONTINUED | COMMUNITY
End: 2020-12-15

## 2020-12-16 NOTE — ASSESSMENT
[FreeTextEntry1] : Fei Parks has severe symptomatic aortic stenosis with mild LV dysfunction.  He responded to IV Lasix in the hospital and does have renal insufficiency.  He is feeling better after his recent hospitalization.  He has no angina or syncope or significant dizziness\par \par Although he is 89, he is quite mentally intact and physically intact.\par \par 1.  Severe aortic stenosis\par \par 2.  CHF improved with IV Lasix now on p.o. Lasix\par \par 3.  Renal insufficiency patient apparently has 1 kidney functioning and does have a creatinine over 2 in the hospital\par \par 4.  Conduction disease right bundle branch block right axis deviation first-degree AV block\par \par Presently is hemodynamically stable and free of significant symptoms

## 2020-12-16 NOTE — PHYSICAL EXAM
[Normal Appearance] : normal appearance [General Appearance - Well Nourished] : well nourished [Heart Sounds] : normal S1 and S2 [Auscultation Breath Sounds / Voice Sounds] : lungs were clear to auscultation bilaterally [Abdomen Soft] : soft [Abdomen Tenderness] : non-tender [Cyanosis, Localized] : no localized cyanosis [Oriented To Time, Place, And Person] : oriented to person, place, and time [Affect] : the affect was normal [Mood] : the mood was normal [FreeTextEntry1] : Completely intact and sharp at age 89

## 2020-12-16 NOTE — DISCUSSION/SUMMARY
[FreeTextEntry1] : Had a long discussion with the patient and his daughter and son.  Although he is 89 he is quite intact.  He would be a candidate for transaortic valve replacement but clearly is at somewhat increased risk because of his renal insufficiency and age.\par \par I tried to discuss some of the risks and benefits.  It is likely he would require a pacemaker at that time because of his conduction disease\par \par Presently he is inclined to be treated medically but would like to have an evaluation by the structural heart team so that he fully understands what might be involved and whether he would be a candidate.

## 2020-12-16 NOTE — REASON FOR VISIT
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

## 2021-01-05 ENCOUNTER — NON-APPOINTMENT (OUTPATIENT)
Age: 86
End: 2021-01-05

## 2021-01-05 ENCOUNTER — APPOINTMENT (OUTPATIENT)
Dept: CARDIOTHORACIC SURGERY | Facility: CLINIC | Age: 86
End: 2021-01-05
Payer: MEDICARE

## 2021-01-05 VITALS
WEIGHT: 168.5 LBS | OXYGEN SATURATION: 98 % | BODY MASS INDEX: 27.08 KG/M2 | SYSTOLIC BLOOD PRESSURE: 131 MMHG | RESPIRATION RATE: 13 BRPM | DIASTOLIC BLOOD PRESSURE: 69 MMHG | HEIGHT: 66 IN | HEART RATE: 73 BPM | TEMPERATURE: 97.4 F

## 2021-01-05 DIAGNOSIS — Z82.49 FAMILY HISTORY OF ISCHEMIC HEART DISEASE AND OTHER DISEASES OF THE CIRCULATORY SYSTEM: ICD-10-CM

## 2021-01-05 DIAGNOSIS — Z60.2 PROBLEMS RELATED TO LIVING ALONE: ICD-10-CM

## 2021-01-05 DIAGNOSIS — Z86.79 PERSONAL HISTORY OF OTHER DISEASES OF THE CIRCULATORY SYSTEM: ICD-10-CM

## 2021-01-05 DIAGNOSIS — Z80.7 FAMILY HISTORY OF OTHER MALIGNANT NEOPLASMS OF LYMPHOID, HEMATOPOIETIC AND RELATED TISSUES: ICD-10-CM

## 2021-01-05 PROCEDURE — 99205 OFFICE O/P NEW HI 60 MIN: CPT

## 2021-01-05 PROCEDURE — 93000 ELECTROCARDIOGRAM COMPLETE: CPT

## 2021-01-05 RX ORDER — TAMSULOSIN HYDROCHLORIDE 0.4 MG/1
0.4 CAPSULE ORAL DAILY
Qty: 60 | Refills: 0 | Status: ACTIVE | COMMUNITY
Start: 2021-01-05

## 2021-01-05 RX ORDER — FINASTERIDE 5 MG/1
5 TABLET, FILM COATED ORAL
Qty: 90 | Refills: 0 | Status: ACTIVE | COMMUNITY
Start: 2021-01-05

## 2021-01-05 SDOH — SOCIAL STABILITY - SOCIAL INSECURITY: PROBLEMS RELATED TO LIVING ALONE: Z60.2

## 2021-01-05 NOTE — PHYSICAL EXAM
[General Appearance - Alert] : alert [General Appearance - In No Acute Distress] : in no acute distress [Sclera] : the sclera and conjunctiva were normal [PERRL With Normal Accommodation] : pupils were equal in size, round, and reactive to light [Extraocular Movements] : extraocular movements were intact [Outer Ear] : the ears and nose were normal in appearance [Oropharynx] : the oropharynx was normal [Jugular Venous Distention Increased] : there was no jugular-venous distention [Respiration, Rhythm And Depth] : normal respiratory rhythm and effort [Auscultation Breath Sounds / Voice Sounds] : lungs were clear to auscultation bilaterally [Rhythm Regular] : regular [Examination Of The Chest] : the chest was normal in appearance [Breast Appearance] : normal in appearance [Bowel Sounds] : normal bowel sounds [Abdomen Soft] : soft [Cervical Lymph Nodes Enlarged Posterior Bilaterally] : posterior cervical [Cervical Lymph Nodes Enlarged Anterior Bilaterally] : anterior cervical [No CVA Tenderness] : no ~M costovertebral angle tenderness [Involuntary Movements] : no involuntary movements were seen [Skin Color & Pigmentation] : normal skin color and pigmentation [No Focal Deficits] : no focal deficits [Oriented To Time, Place, And Person] : oriented to person, place, and time [Impaired Insight] : insight and judgment were intact [Left Carotid Bruit] : no bruit heard over the left carotid [Right Carotid Bruit] : no bruit heard over the right carotid

## 2021-01-05 NOTE — CONSULT LETTER
[Dear  ___] : Dear ~LAXMI, [Courtesy Letter:] : I had the pleasure of seeing your patient, [unfilled], in my office today. [Please see my note below.] : Please see my note below. [Consult Closing:] : Thank you very much for allowing me to participate in the care of this patient.  If you have any questions, please do not hesitate to contact me. [Sincerely,] : Sincerely, [FreeTextEntry2] : Dr. Kian Isidro [FreeTextEntry3] : Kevin Vargas MD\par \par Cardiovascular & Thoracic Surgery\par Professor\par [facility]\par \par

## 2021-01-05 NOTE — REVIEW OF SYSTEMS
[Feeling Tired] : feeling tired [Loss Of Hearing] : hearing loss [SOB on Exertion] : shortness of breath during exertion [Joint Pain] : joint pain [Joint Swelling] : joint swelling [Negative] : Heme/Lymph [Chest Pain] : no chest pain

## 2021-01-05 NOTE — DATA REVIEWED
[FreeTextEntry1] : Echo: Date: 12/11/2020 \par Dimensions: Normal Values:LA: 5.4 2.0 - 4.0 cm Ao: 3.8 2.0 - 3.8 cm SEPTUM: 1.3 0.6 - 1.2 cm PWT: 1.2 0.6 - 1.1 cm LVIDd: 4.9 3.0 - 5.6 cm LVIDs: 3.9 1.8 - 4.0 cm Derived variables:LVMI: 126 g/m2RWT: 0.48EF (Visual Estimate): 55 %Doppler Peak Velocity (m/sec): MV=1.7 AoV=4.1.  Observations:Mitral Valve: Mild mitral stenosis due to mitral annular,chordal, and leaflet calcification.Mean gradient 3.1 mmHg at 85/min.Minimal mitral regurgitation.Aortic Valve/Aorta: Severe aortic stenosis.---Aortic TVI 97.7 cm. Mean gradient 46 mmHg.---LVOTd 2.3 cm. LVOT TVI 13.8 cm.---Aortic valve area by continuity 0.6 cm2.Mild aortic regurgitation.Normal aortic root size.Left Atrium: Severely dilated left atrium.Left Ventricle: Mild concentric left ventricular hypertrophy. Discrete basal septal hypertrophy. Estimated ejection fraction 55%.The basal anterolateral wall and inferolateral wall appear hypokinetic.Right Heart: Normal right atrium. Normal right ventricular size and function.Normal tricuspid valve.Normal pulmonic valve. Minimal pulmonic regurgitation.Pericardium/Pleura: Normal pericardium with no pericardial effusion.Hemodynamic: No evidence of pulmonary hypertension.------------------------------------------------------------------------\par

## 2021-01-05 NOTE — HISTORY OF PRESENT ILLNESS
[FreeTextEntry1] : Mr. Parks is a 89 year old male, past medical history includes HTN, HLD, hypothyroidism, BPH, CRI (pt. reports solitary functioning kidney), and melanoma to scalp and clavicle s/p excision in August 2019). referred by Dr. Kian Isidro for evaluation of aortic stenosis. He was in his usual state of health until late November when he began noticing increasing exertional dyspnea and bilateral lower extremity edema. He was admitted for acute CHF exacerbation from 12/9-12/12 that responded well to IV diuresis. TTE at that visit reported severe aortic stenosis. Since discharge he reports that when compared to how he was feeling on hospital admission he feels better. During hospitalization he was found to be bradycardiac (HR 30s) and his Toprol and Vasotec were discontinued. He is now taking Lasix 40mg daily. He lives alone, independent in all his ADLs. \par \par \par

## 2021-01-05 NOTE — ASSESSMENT
[FreeTextEntry1] : Mr. Parks is a 89 year old male, past medical history includes HTN, HLD, hypothyroidism, BPH, CRI (pt. reports solitary functioning kidney), and melanoma to scalp and clavicle s/p excision in August 2019). referred by Dr. Kian Isidro for evaluation of aortic stenosis. He was in his usual state of health until late November when he began noticing increasing exertional dyspnea and bilateral lower extremity edema. He was admitted for acute CHF exacerbation from 12/9-12/12 that responded well to IV diuresis. TTE at that visit reported severe aortic stenosis. Since discharge he reports that when compared to how he was feeling on hospital admission he feels better. During hospitalization he was found to be bradycardiac (HR 30s) and his Toprol and Vasotec were discontinued. He is now taking Lasix 40mg daily. He lives alone, independent in all his ADLs. \par \par After discussion, the patient is agreeable to proceed with [facility]. He will require\par \par 1) Renal consult\par 2) coronary angiography with Dr. Isidro\par 3)CTA\par 4) likely will require PPM (1st degree HB and RBBB\par \par \par \par \par I personally performed the services described in the documentation, reviewed the documentation recorded by the scribe in my presence and it accurately and completely records my words and actions.\par \par I, Barbara Guaman NP, am scribing for Dr. Kevin Vargas, the following sections HISTORY OF PRESENT ILLNESS, PAST MEDICAL/FAMILY/SOCIAL HISTORY; REVIEW OF SYSTEMS; VITAL SIGNS; PHYSICAL EXAM; DISPOSITION.\par

## 2021-01-07 ENCOUNTER — NON-APPOINTMENT (OUTPATIENT)
Age: 86
End: 2021-01-07

## 2021-01-07 ENCOUNTER — APPOINTMENT (OUTPATIENT)
Dept: CARDIOLOGY | Facility: CLINIC | Age: 86
End: 2021-01-07
Payer: MEDICARE

## 2021-01-07 VITALS
WEIGHT: 168 LBS | HEART RATE: 61 BPM | OXYGEN SATURATION: 98 % | DIASTOLIC BLOOD PRESSURE: 50 MMHG | HEIGHT: 66 IN | SYSTOLIC BLOOD PRESSURE: 118 MMHG | BODY MASS INDEX: 27 KG/M2

## 2021-01-07 DIAGNOSIS — R06.02 SHORTNESS OF BREATH: ICD-10-CM

## 2021-01-07 PROCEDURE — 93000 ELECTROCARDIOGRAM COMPLETE: CPT

## 2021-01-07 PROCEDURE — 99214 OFFICE O/P EST MOD 30 MIN: CPT

## 2021-01-07 RX ORDER — METOPROLOL SUCCINATE 25 MG/1
25 TABLET, EXTENDED RELEASE ORAL
Qty: 90 | Refills: 0 | Status: DISCONTINUED | COMMUNITY
Start: 2020-06-02 | End: 2021-01-07

## 2021-01-07 RX ORDER — FUROSEMIDE 40 MG/1
40 TABLET ORAL DAILY
Qty: 90 | Refills: 2 | Status: ACTIVE | COMMUNITY
Start: 2020-12-16 | End: 1900-01-01

## 2021-01-07 RX ORDER — FAMOTIDINE 20 MG/1
20 TABLET, FILM COATED ORAL
Qty: 180 | Refills: 0 | Status: ACTIVE | COMMUNITY
Start: 2020-08-17

## 2021-01-07 RX ORDER — FAMOTIDINE 40 MG/1
40 TABLET, FILM COATED ORAL
Qty: 90 | Refills: 0 | Status: ACTIVE | COMMUNITY
Start: 2020-08-17

## 2021-01-07 NOTE — PHYSICAL EXAM
[Normal Appearance] : normal appearance [General Appearance - Well Nourished] : well nourished [Respiration, Rhythm And Depth] : normal respiratory rhythm and effort [Auscultation Breath Sounds / Voice Sounds] : lungs were clear to auscultation bilaterally [Heart Sounds] : normal S1 and S2 [Abdomen Tenderness] : non-tender [Cyanosis, Localized] : no localized cyanosis [Mood] : the mood was normal [FreeTextEntry1] : Completely intact and sharp at age 89

## 2021-01-07 NOTE — DISCUSSION/SUMMARY
[FreeTextEntry1] : I had a long conversation with the patient.  At this point, he feels that his lifestyle is good, that he is not overly symptomatic and he is not looking "to live to the age of [place]"\par \par At this point he would like to hold off on any further evaluation with cardiac catheterization CT without contrast.  He however would like to follow with nephrology and has been referred to Dr. Truman Girard\par \par Patient will follow up with me again in 3 months.  For now he will continue on Lasix 40 mg a day

## 2021-01-07 NOTE — ASSESSMENT
[FreeTextEntry1] : Fei Parks has severe symptomatic aortic stenosis with mild LV dysfunction.  He responded to IV Lasix in the hospital and does have renal insufficiency.  He is feeling better after his recent hospitalization.  He has no angina or syncope or significant dizziness\par \par Although he is 89, he is quite mentally intact and physically intact.\par \par 1.  Severe aortic stenosis\par \par 2.  CHF improved with IV Lasix now on p.o. Lasix\par \par 3.  Renal insufficiency patient apparently has 1 kidney functioning and does have a creatinine over 2 in the hospital\par \par 4.  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  Presently he is happy with his lifestyle.  He appropriately has concerns about the risk of undergoing a procedure at age 89 when presently he is relatively asymptomatic despite his severe aortic stenosis

## 2021-01-07 NOTE — HISTORY OF PRESENT ILLNESS
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  He denies hypertension diabetes and is a non-smoker.  He has had no significant hospitalizations.\par \par He has been followed by cardiologist for heart murmur and aortic stenosis\par \par Over the last several months he has had shortness of breath and weight gain but over the last week or 2 he developed increasing shortness of breath and edema.  He denies chest pain or exertional dizziness or syncope.  He has a known right bundle branch block.\par \par He was admitted to [facility] in fluid overload and was treated with IV Lasix with excellent diuresis and improvement in his symptoms.  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  Her cardiogram in the hospital revealed mild LV dysfunction with severe aortic stenosis.\par \par Presently he is feeling fine without chest pain chest pressure shortness of breath.  As noted above he lost a significant amount of weight and his edema resolved almost completely\par \par On the last visit, I discussed with the patient options of medical therapy versus proceeding with transaortic valve replacement.\par \par He went for consultation to the structural heart team at [facility].  He was deemed a candidate for transaortic valve replacement.  He was referred to nephrology for evaluation of his kidney function\par \par The patient came today to discuss whether to be treated medically or to proceed with an aggressive approach.\par Clinically he is feeling better.  He is lost a significant amount of weight and denies significant shortness of breath he denies exertional chest pain chest pressure or exertional dizziness.  At this point he is overall satisfied with his lifestyle

## 2021-01-07 NOTE — REASON FOR VISIT
[FreeTextEntry1] : Fei Parks 87 years old returns for follow-up to discuss his aortic stenosis and to discuss whether to proceed with transaortic valve replacement

## 2021-01-14 NOTE — DISCUSSION/SUMMARY
[Severe Aortic Stenosis] : severe aortic stenosis [Deteriorating] : deteriorating [Multidetector Cardiac CT] : multidetector cardiac CT [Cardiac Catheterization] : cardiac catheterization [Coronary Angiography] : coronary angiography [None] : none [Aortic Valve Replacement] : aortic valve replacement [Patient] : the patient [Family] : the patient's family [FreeTextEntry1] : Fei presents for evaluation of severe AS with recently progressive symptoms, including a CHF hospitalization, and treatment options. We had an extensive discussion regarding the potential risks and benefits of TAVR, as well as the life expectancy and clinical course in patients with untreated, symptomatic, severe aortic stenosis. His most significant concerns are his renal function and the risk of stroke--both very appropriate concerns. Regarding his age of 89, I explained to him that in our opinion, age is less of a factor, it is more about mobility, degree of independence, and cognitive function--he is favorable with respect to all of these qualities. He would like to think about it further before deciding if he wishes to proceed with the necessary testing. I am recommending he see a Nephrologist in preparation for the contrast-based exams--he would like to discuss this with Dr Isidro. If he opts to proceed, he will be scheduled for a cardiac catheterization (Dr Isidro) and cardiac structural CT. I suspect he has significant CAD (his ECG demonstrates a possible prior IWMI). He has a RBBB and 1st degree AV block, which means he would almost certainly need a PPM if he were to undergo TAVR. Once completed, all imaging would be reviewed by the Heart Team and an optimal treatment strategy recommended. Given his concerns regarding stroke, we discussed cerebral embolic protection for TAVR, and we would screen his CT to see if he was a suitable candidate for [place]. He will let us know if and when he decides to proceed.

## 2021-01-14 NOTE — REVIEW OF SYSTEMS
[Fever] : no fever [Chills] : no chills [Feeling Fatigued] : feeling fatigued [Blurry Vision] : no blurred vision [Loss Of Hearing] : hearing loss [Dyspnea on exertion] : dyspnea during exertion [Chest Pain] : no chest pain [Lower Ext Edema] : lower extremity edema [Nocturia] : nocturia [Joint Pain] : joint pain [see HPI] : see HPI [Negative] : Heme/Lymph

## 2021-01-14 NOTE — HISTORY OF PRESENT ILLNESS
[FreeTextEntry1] : Mr. Parks is a 89 year old male referred by Dr. Kian Isidro for evaluation of aortic stenosis. He was in his usual state of health until late November when he began noticing increasing exertional dyspnea and bilateral lower extremity edema. He was admitted for acute CHF exacerbation from 12/9-12/12 that responded well to IV diuresis. TTE at that visit reported severe aortic stenosis and segmental wall motion abnormalities, with an overall EF of 55%. \par \par Past medical history includes HTN, HLD, hypothyroidism, BPH, CRI (pt. reports solitary functioning kidney, noting "one of my kidneys is almost a complete cyst"), and melanoma (scalp and clavicle, s/p excision in August 2019). \par \par He notes that his AS was previously being followed by Dr Eulogio Tran, and then his associate Dr Helms. He notes they were not seeing patients due to the COVID-19 pandemic, and he ultimately established cardiac care with Dr Isidro--he was scheduled to see him and then wound up admitted to the hospital with CHF, at which time he saw him as an inpatient (and once since discharge). He improved with Lasix and was discharged on it. He lost 15 pounds and his current dry weight is 167-169 pounds. He is also being treated for BPH, HLD, and hypothyroidism. He admits to feeling fatigued, noting "it used to be awful" but a little better as of late. He has no angina or syncope. He is accompanied by his son.

## 2021-01-14 NOTE — PHYSICAL EXAM
[General Appearance - Well Developed] : well developed [Normal Appearance] : normal appearance [Well Groomed] : well groomed [General Appearance - Well Nourished] : well nourished [General Appearance - In No Acute Distress] : no acute distress [Eyelids - No Xanthelasma] : the eyelids demonstrated no xanthelasmas [No Oral Pallor] : no oral pallor [Normal Jugular Venous V Waves Present] : normal jugular venous V waves present [Normal Rate] : normal [Rhythm Regular] : regular [III] : a grade 3 [___ +] : bilateral [unfilled]U+ pretibial pitting edema [Respiration, Rhythm And Depth] : normal respiratory rhythm and effort [Exaggerated Use Of Accessory Muscles For Inspiration] : no accessory muscle use [Auscultation Breath Sounds / Voice Sounds] : lungs were clear to auscultation bilaterally [Bowel Sounds] : normal bowel sounds [Abdomen Soft] : soft [FreeTextEntry1] : gait not assessed [Nail Clubbing] : no clubbing of the fingernails [Cyanosis, Localized] : no localized cyanosis [Skin Color & Pigmentation] : normal skin color and pigmentation [] : no rash [Oriented To Time, Place, And Person] : oriented to person, place, and time [Affect] : the affect was normal [Mood] : the mood was normal [No Anxiety] : not feeling anxious

## 2021-01-22 ENCOUNTER — NON-APPOINTMENT (OUTPATIENT)
Age: 86
End: 2021-01-22

## 2021-01-25 ENCOUNTER — APPOINTMENT (OUTPATIENT)
Dept: CARDIOLOGY | Facility: CLINIC | Age: 86
End: 2021-01-25

## 2021-02-16 ENCOUNTER — APPOINTMENT (OUTPATIENT)
Dept: CARDIOLOGY | Facility: CLINIC | Age: 86
End: 2021-02-16
Payer: MEDICARE

## 2021-02-16 VITALS
WEIGHT: 169 LBS | BODY MASS INDEX: 27.28 KG/M2 | DIASTOLIC BLOOD PRESSURE: 76 MMHG | OXYGEN SATURATION: 95 % | HEART RATE: 86 BPM | SYSTOLIC BLOOD PRESSURE: 110 MMHG

## 2021-02-16 DIAGNOSIS — I50.9 HEART FAILURE, UNSPECIFIED: ICD-10-CM

## 2021-02-16 DIAGNOSIS — I35.0 NONRHEUMATIC AORTIC (VALVE) STENOSIS: ICD-10-CM

## 2021-02-16 DIAGNOSIS — N18.9 CHRONIC KIDNEY DISEASE, UNSPECIFIED: ICD-10-CM

## 2021-02-16 DIAGNOSIS — I45.10 UNSPECIFIED RIGHT BUNDLE-BRANCH BLOCK: ICD-10-CM

## 2021-02-16 PROCEDURE — 99215 OFFICE O/P EST HI 40 MIN: CPT

## 2021-02-16 NOTE — PHYSICAL EXAM
[Normal Appearance] : normal appearance [General Appearance - Well Nourished] : well nourished [Heart Sounds] : normal S1 and S2 [Abdomen Tenderness] : non-tender [Cyanosis, Localized] : no localized cyanosis [Mood] : the mood was normal [FreeTextEntry1] : Completely intact and sharp at age 89

## 2021-02-16 NOTE — ASSESSMENT
[FreeTextEntry1] : Fei Parks has severe symptomatic aortic stenosis with mild LV dysfunction.  He responded to IV Lasix in the hospital and does have renal insufficiency.  He is feeling better after his recent hospitalization.  He has no angina or syncope or significant dizziness\par \par Although he is 89, he is quite mentally intact and physically intact.\par Recently his shortness of breath has increased with shortness of breath with mild degrees of exertion.  He has some edema and neck vein distention and today some basal rales\par \par 1.  Severe aortic stenosis\par \par 2.  CHF improved with IV Lasix now on p.o. Lasix\par \par 3.  Renal insufficiency patient apparently has 1 kidney functioning and does have a creatinine over 2 in the hospital.  Nephrology consult feels his renal function is stable and no further therapy needed\par \par 4.  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

## 2021-02-16 NOTE — REASON FOR VISIT
[FreeTextEntry1] : Fei Parks almost 89 years old with severe aortic stenosis, chronic renal insufficiency with progressive shortness of breath

## 2021-02-16 NOTE — DISCUSSION/SUMMARY
[FreeTextEntry1] : We again had a long discussion about transaortic valve replacement the risk benefits.  He would probably need a pacemaker at the time of implantation as well and would need a cardiac catheterization and CT to evaluate his vasculature\par \par He is very conflicted if he wants to proceed aggressively or continue medical therapy\par \par For now I told him to increase his Lasix to 60 mg a day and follow-up with me again in 1 month.  He will try to be much more careful with the salt in his diet.

## 2021-02-16 NOTE — HISTORY OF PRESENT ILLNESS
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  He denies hypertension diabetes and is a non-smoker.  He has had no significant hospitalizations.\par \par He has been followed by cardiologist for heart murmur and aortic stenosis\par \par Over the last several months he has had shortness of breath and weight gain but over the last week or 2 he developed increasing shortness of breath and edema.  He denies chest pain or exertional dizziness or syncope.  He has a known right bundle branch block.\par \par He was admitted to [facility] in fluid overload and was treated with IV Lasix with excellent diuresis and improvement in his symptoms.  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  Her cardiogram in the hospital revealed mild LV dysfunction with severe aortic stenosis\par \par He went for consultation to the structural heart team at [facility].  He was deemed a candidate for transaortic valve replacement.  He was referred to nephrology for evaluation of his kidney function\par \par Patient had decided on last visit to continue with conservative therapy\par \par However he has noted shortness of breath with mild degrees of exertion though no orthopnea or PND.  He has no dizziness chest pressure or syncope.  Recently he has not been that careful with the salt in his diet and felt more short of breath.\par \par He continues to weigh whether he should proceed with an aggressive approach at age 89 with transaortic valve replacement we will continue medical therapy

## 2021-03-19 ENCOUNTER — APPOINTMENT (OUTPATIENT)
Dept: CARDIOLOGY | Facility: CLINIC | Age: 86
End: 2021-03-19

## 2021-06-04 NOTE — ED PROVIDER NOTE - OBJECTIVE STATEMENT
[Well Developed] : well developed [Well Nourished] : well nourished [No Acute Distress] : no acute distress [Normal Conjunctiva] : normal conjunctiva [Normal Venous Pressure] : normal venous pressure [No Carotid Bruit] : no carotid bruit [Normal S1, S2] : normal S1, S2 [No Murmur] : no murmur [No Rub] : no rub [No Gallop] : no gallop [Clear Lung Fields] : clear lung fields [Good Air Entry] : good air entry [No Respiratory Distress] : no respiratory distress  [Soft] : abdomen soft 89yom pmhx HTN HLD hx of Melanoma here with week of progressively worsening sob and urrutia. reports past 2 days with minimal activity along exhaustion. no fever no sob no travel hx. No calf pain or leg swelling. Former smoker. No cardiac hx. [Non Tender] : non-tender [No Masses/organomegaly] : no masses/organomegaly [Normal Bowel Sounds] : normal bowel sounds [Normal Gait] : normal gait [No Edema] : no edema [No Cyanosis] : no cyanosis [No Clubbing] : no clubbing [No Varicosities] : no varicosities [No Rash] : no rash [No Skin Lesions] : no skin lesions [Moves all extremities] : moves all extremities [No Focal Deficits] : no focal deficits [Normal Speech] : normal speech [Alert and Oriented] : alert and oriented [Normal memory] : normal memory [de-identified] : +facial swelling/ecchymosis

## 2021-08-27 NOTE — ED ADULT TRIAGE NOTE - HEART RATE (BEATS/MIN)
75 PAST SURGICAL HISTORY:  Hearing loss, right h/o sedated ABR 5/10/19, 8/2020    History of creation of ventriculoperitoneal shunt 1/26/19    Medulloblastoma stereotactic suboccipital craniectomy C1 laminectomy for resection of brain tumor with Dr. Ling on 4/24/19    Port-A-Cath in place 2/2019    S/P brain surgery resection of nodule near prior tumor resection 9/17/20 at [facility], negative pathology    Status post craniotomy partial resection of posterior fossa medulloblastoma 1/23/19

## 2021-12-04 NOTE — ED PROVIDER NOTE - NSSUBSTANCEUSE_GEN_ALL_CORE_SD
[facility]    Progress Note    Dajuanmat Joshua Patient Status:  Inpatient    10/22/1931 MRN [id number]   Location [facility] 4W/SW/SE Attending Sujit Bermudez MD   Hosp Day # 7 PCP Kathryn Anguiano MD         Assessment and negative for ischemia;   - INR 1.10 today; warfarin 4 mg po tonight;     Gait abnormality  Pt has high fall risk; using manual wheelchair, though independent use is limited due to bilateral hand osteoarthritis;      Right hand basal cell skin cancer  S/p b never used adenoma   the patient reports occasional loose stools.  The patient has been advised to see gastroenterologist, Dr. Rachel Francis, as an outpatient for colonoscopy though he has declined repeat colonoscopy     Vitamin B12 deficiency  the patient had been receiving ophthalmic solution 1 drop, 1 drop, Ophthalmic, QID  loperamide (IMODIUM) cap 2 mg, 2 mg, Oral, QID PRN  Miconazole Nitrate 2 % powder, , Topical, PRN  potassium chloride (K-DUR) CR tab 10 mEq, 10 mEq, Oral, Daily  predniSONE (DELTASONE) tab 2.5 mg, 2.5 mg at 11:38 AM                             Myriam Ga MD

## 2022-04-12 NOTE — H&P PST ADULT - NEGATIVE NEUROLOGICAL SYMPTOMS
(1) age 41-60 years
no loss of consciousness/no transient paralysis/no paresthesias/no tremors/no confusion/no vertigo/no loss of sensation/no difficulty walking/no headache/no focal seizures/no weakness/no generalized seizures

## 2022-04-27 NOTE — ED ADULT TRIAGE NOTE - SPO2 (%)
94 Dermal Autograft Text: The defect edges were debeveled with a #15 scalpel blade.  Given the location of the defect, shape of the defect and the proximity to free margins a dermal autograft was deemed most appropriate.  Using a sterile surgical marker, the primary defect shape was transferred to the donor site. The area thus outlined was incised deep to adipose tissue with a #15 scalpel blade.  The harvested graft was then trimmed of adipose and epidermal tissue until only dermis was left.  The skin graft was then placed in the primary defect and oriented appropriately.

## 2022-10-05 NOTE — ED PROVIDER NOTE - CONSTITUTIONAL DISTRESS
Holter ePatch Instructions    On 10/05/22 you had a Holter ePatch applied.  The sensor is to be worn for 24 hours. You may remove the sensor at 4:00 pm on 10/06/2022.  The sensor acquires your ECG signals and stores them internally while you go about your normal daily activities.  Your physician will review the recordings and correlate them with any symptoms recorded in your diary.    Daily Use and Operation    DO  Follow your normal routine.  Don't avoid stress, work, or exercise.  It is important to record your heartbeat during your normal daily activities.  Try sleeping on your back.  While wearing monitor stay away from magnets, electric blankets, metal detectors, and high-voltage areas such as power lines.  These things can affect the recording.  Call the clinic if the sensor disconnects or displays a red light.  Microwave oven use is OK.  You may shower normally; however for optimal results, avoid spraying water directly onto the sensor. The ePatch is water resistant, NOT waterproof.    DO NOT  Bathe or swim while wearing the monitor. The ePatch is water resistant, NOT waterproof. It should not be submerged in water.  Remove recorder before the monitoring period is complete.  Have an MRI or Nuclear Medicine procedure while wearing monitor.    Patient Diary  Document any symptoms you feel in the patient diary such as: fatigue, dizziness, chest pain, skipped heart beats/palpitations, lightheadedness or shortness of breath. Be sure to note what your were doing at the time of the symptoms.  Write in the time of day for each entry you make.    Returning Holter Monitor  At completion of monitoring period. Carefully remove the Holter ePatch from your body and return the sensor along with diary to clinic for processing.  Follow up with your physician for results.    no apparent

## 2022-12-07 NOTE — ED PROVIDER NOTE - DATE/TIME 1
Discussed importance of advanced medical directives with patient. Patient is capable of making decisions.   Anupam Domínguez NP-C 24-Jul-2019 18:52

## 2023-04-28 NOTE — ED ADULT TRIAGE NOTE - SPO2 (%)
Date of Service: 04/28/2023    SURGEON:  Tarah Jay MD.    ASSISTANT:  None.    PREOPERATIVE DIAGNOSIS:   Precarinal node enlargement with known history of small cell lung cancer.    POSTOPERATIVE DIAGNOSIS:   Precarinal node enlargement with known history of small cell lung cancer.    Consent was obtained.  Complications including risk of pneumothorax, bleed, arrhythmia, rare incidence of death were discussed.     DESCRIPTION OF PROCEDURE:  The patient was intubated with Dr. Erlin Merrill with 8.5 ET tube.    After intubation, adult bronchoscope was advanced through the ET tube.  Visualized portion of the trachea appeared normal.  Akila was sharp.  Both bronchial trees visualized down to the tertiary subsegment and appeared normal.  Lavage done to the right middle lobe with 60 mL after wedging distally and 20 mL was collected with clear saline.    Bronchoscope removed and endobronchial ultrasound subsequently, advanced.  No enlarged lymph node except for the precarina.  I obtained a 21-gauge needle passes with the third pass looked at with zakiya.  Atypical cells seen, but seen crushed tissue also.  Subsequently, took two more biopsies for a total of 5 and I introduced a 21-gauge needle to preserve tissue for two more passes.  All were collected and sent for the usual testing.  Specimen on site was not suspicious of lymphoma.    Tolerated the procedure well.  No complication.      Dictated By: Tarah Jay MD  Signing Provider: Tarah Jay MD    MR/smr ([id number])   DD: 04/28/2023 7:56:57 AM TD: 04/28/2023 8:03:04 AM      
89

## 2023-06-23 NOTE — ED ADULT TRIAGE NOTE - NSWEIGHTCALCTOOLDRUG_GEN_A_CORE
JAMES PATRICK [id number]     Study Date: 06-23-23  Duration: 20 min  --------------------------------------------------------------------------------------------------  History:  CC/ HPI Patient is a 69y old  Male who presents with a chief complaint of METABOLIC ENCEPHALOPATHY     (23 Jun 2023 11:02)    MEDICATIONS  (STANDING):  aspirin enteric coated 81 milliGRAM(s) Oral daily  atorvastatin 40 milliGRAM(s) Oral at bedtime  calcium acetate 667 milliGRAM(s) Oral three times a day with meals  chlorhexidine 2% Cloths 1 Application(s) Topical daily  DOPamine Infusion 2 MICROgram(s)/kG/Min (3.41 mL/Hr) IV Continuous <Continuous>  heparin   Injectable 5000 Unit(s) SubCutaneous every 8 hours  norepinephrine Infusion 0.05 MICROgram(s)/kG/Min (4.26 mL/Hr) IV Continuous <Continuous>  pantoprazole  Injectable 40 milliGRAM(s) IV Push daily  piperacillin/tazobactam IVPB.. 3.375 Gram(s) IV Intermittent every 12 hours    --------------------------------------------------------------------------------------------------  Study Interpretation:    [[[Abbreviation Key:  PDR=alpha rhythm/posterior dominant rhythm. A-P=anterior posterior.  Amplitude: ‘very low’:<20; ‘low’:20-49; ‘medium’:; ‘high’:>150uV.  Persistence for periodic/rhythmic patterns (% of epoch) ‘rare’:<1%; ‘occasional’:1-10%; ‘frequent’:10-50%; ‘abundant’:50-90%; ‘continuous’:>90%.  Persistence for sporadic discharges: ‘rare’:<1/hr; ‘occasional’:1/min-1/hr; ‘frequent’:>1/min; ‘abundant’:>1/10 sec.  RPP=rhythmic and periodic patterns; GRDA=generalized rhythmic delta activity; FIRDA=frontal intermittent GRDA; LRDA=lateralized rhythmic delta activity; TIRDA=temporal intermittent rhythmic delta activity;  LPD=PLED=lateralized periodic discharges; GPD=generalized periodic discharges; BIPDs =bilateral independent periodic discharges; Mf=multifocal; SIRPDs=stimulus induced rhythmic, periodic, or ictal appearing discharges; BIRDs=brief potentially ictal rhythmic discharges >4 Hz, lasting .5-10s; PFA (paroxysmal bursts >13 Hz or =8 Hz <10s).  Modifiers: +F=with fast component; +S=with spike component; +R=with rhythmic component.  S-B=burst suppression pattern.  Max=maximal. N1-drowsy; N2-stage II sleep; N3-slow wave sleep. SSS/BETS=small sharp spikes/benign epileptiform transients of sleep. HV=hyperventilation; PS=photic stimulation]]]    Daily EEG Visual Analysis    FINDINGS:      Background:  Continuity: Continuous  Symmetry: Symmetric  Posterior dominant rhythm (PDR): 7.5-8 Hz, reactive to eye closure. Symmetric low-amplitude frontal beta in wakefulness.  State Change: Present  Voltage: Normal  Anterior Posterior Gradient: Present  Other background findings: Occasional diffuse polymorphic delta slowing in wakefulness.  Breach: Absent    Background Slowing:  Generalized slowing: As above  Focal slowing: None    State Changes:   Drowsiness is characterized by slowing of the background activity with increase in prevalence of diffuse polymorphic delta activity and the appearance of occasional frontally predominant generalized rhythmic delta activity (GRDA) at 1 Hz.  Stage 2 sleep is characterized by symmetric K complexes and sleep spindles.     Sporadic Epileptiform Discharges:    None    Rhythmic and Periodic Patterns (RPPs):  None     Electrographic and Electroclinical seizures:  None    Other Clinical Events:  None    Activation Procedures:   Hyperventilation was not performed.    Photic stimulation was performed and did not elicit any abnormalities.      Artifacts:  Intermittent myogenic and movement artifacts are present.    EKG:  Single-lead EKG shows regular rhythm.    EEG Classification / Summary:  Abnormal routine EEG in the awake, drowsy, and asleep states.  -Mild diffuse slowing  -No focal or epileptiform abnormalities are captured    Clinical Impression:  -Mild diffuse cerebral dysfunction is nonspecific in etiology.             -------------------------------------------------------------------------------------------------------  [facility] EEG Reading Room Ph#: [phone]  Epilepsy Answering Service after 5PM and before 8:30AM: Ph#: [phone]    Amy Valladares MD  Attending Physician, [facility]  
 used

## 2023-07-12 NOTE — ED PROVIDER NOTE - CONSTITUTIONAL DEVELOPMENT, MLM
Pt arrived to unit without complaint, tolerated Venofer infusion without any adverse reaction. Pt left unit in stable condition without question or concern, AVS provided.
well developed

## 2024-01-10 NOTE — ED ADULT NURSE NOTE - TEMPLATE LIST FOR HEAD TO TOE ASSESSMENT
Outreach attempt was made to schedule a Medicare Wellness Visit. This was the first attempt. Contact was not made, left message.    Fluid/Electrolyte/Metabolic

## 2024-01-19 NOTE — PATIENT PROFILE ADULT - FUNCTIONAL SCREEN CURRENT LEVEL: COMMUNICATION, MLM
Mom states that patient found a family pet  this morning, and he asked if he could talk to [place]. Mom is requesting a return call as soon as possible to discuss/confirm, if possible. Thank you.   0 = understands/communicates without difficulty

## 2024-02-17 NOTE — ED PROVIDER NOTE - PHYSICAL EXAMINATION
Gen: NAD, A&O x 3, lying in bed conversant  HEENT: PERRL, EOMI, mucous membranes moist, no oropharyngeal exudates  Neck:  supple, no lymphadenopathy  Pulmonary: CTAB, no rhonci, wheezes or rales  Cardiac: regular rate and rhythm, normal S1S2, no r/m/g  GI:  soft, nontender, nondistended  Extremities: warm, well perfused, no peripheral edema  Skin: skin intact, no skin tears or rashes or other lesions  Neuro: A&O x3, ambulates without difficult yes Gen: NAD, A&O x 3, lying in bed conversant  HEENT: PERRL, EOMI, mucous membranes moist, no oropharyngeal exudates  Neck:  supple, no lymphadenopathy  Pulmonary: CTAB, no rhonci, wheezes or rales  Cardiac: regular rate and rhythm, normal S1S2, no r/m/g  GI:  soft, nontender, nondistended  Extremities: warm, well perfused, no peripheral edema  Skin: skin intact, no skin tears or rashes or other lesions  Neuro: A&O x3, ambulates without difficult  Attending Girard: Gen: NAD, heent: atrauamtic, eomi, perrla, mmm, op pink, uvula midline, neck; nttp, no nuchal rigidity, chest: nttp, no crepitus, cv: rrr, no murmurs, lungs: ctab, abd: soft, nontender, nondistended, no peritoneal signs, +BS, no guarding, ext: wwp, neg homans, skin: no rash, neuro: awake and alert, following commands, speech clear, sensation and strength intact, no focal deficits

## 2024-04-16 NOTE — ED ADULT TRIAGE NOTE - SPO2 (%)
Lab Results   Component Value Date    WBC 6.04 04/16/2024    HGB 11.0 (L) 04/16/2024    HCT 32.5 (L) 04/16/2024    MCV 96.7 (H) 04/16/2024     (H) 04/16/2024     Lab Results   Component Value Date    NEUTROABS 1.99 04/16/2024     Lab Results   Component Value Date     04/16/2024    K 4.2 04/16/2024     04/16/2024    CO2 26 04/16/2024    BUN 9 04/16/2024    CREATININE 0.7 04/16/2024    GLUCOSE 128 (H) 04/16/2024    CALCIUM 8.7 04/16/2024    PROT 6.0 (L) 04/16/2024    BILITOT <0.2 04/16/2024    ALKPHOS 107 (H) 04/16/2024    AST 26 04/16/2024    ALT 15 04/16/2024    LABGLOM 89 04/16/2024    GFRAA >59 01/12/2022    GLOB 2.5 04/16/2024        95

## 2024-05-07 NOTE — HISTORY OF PRESENT ILLNESS
Visit Date: 5/8/24  Gil Monson III  1941  male 82 y.o.      Subjective:    CC: Patient presents with dementia and weakness. Patient presents for follow up of htn, hyperlipidemia, and bph.    HPI:  Memory Loss    Patient reports onset of memory loss was more than 1 year ago. Onset quality is gradual.     Symptoms associated with memory loss include changes in short-term memory and changes in long-term memory.     Patient does not have the following behavorial problems associated with memory loss: delusions or agitation.    Family and/or patient concerns for memory loss include medication errors, wandering and driving. Patient lives in a/an family member's home.  Hypertension  This is a chronic problem. The current episode started more than 1 year ago. The problem is unchanged. The problem is controlled. There are no associated agents to hypertension. Risk factors for coronary artery disease include dyslipidemia and male gender. Treatments tried: taking medications, no medication side effects. The current treatment provides mild improvement. There are no compliance problems.    Hyperlipidemia  This is a chronic problem. The current episode started more than 1 year ago. The problem is controlled. Recent lipid tests were reviewed and are variable. There are no known factors aggravating his hyperlipidemia. Treatments tried: taking medications, no medication side effects. The current treatment provides mild improvement of lipids. There are no compliance problems.  Risk factors for coronary artery disease include dyslipidemia, hypertension, male sex and obesity.   Benign Prostatic Hypertrophy  This is a chronic problem. The current episode started more than 1 year ago. The problem has been waxing and waning since onset. Nothing aggravates the symptoms. Treatments tried: taking medications, no medication side effects. The treatment provided mild relief.       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  He denies hypertension diabetes and is a non-smoker.  He has had no significant hospitalizations.\par \par He has been followed by cardiologist for heart murmur and aortic stenosis\par \par Over the last several months he has had shortness of breath and weight gain but over the last week or 2 he developed increasing shortness of breath and edema.  He denies chest pain or exertional dizziness or syncope.  He has a known right bundle branch block.\par \par He was admitted to [facility] in fluid overload and was treated with IV Lasix with excellent diuresis and improvement in his symptoms.  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  Her cardiogram in the hospital revealed mild LV dysfunction with severe aortic stenosis.\par \par Presently he is feeling fine without chest pain chest pressure shortness of breath.  As noted above he lost a significant amount of weight and his edema resolved almost completely\par \par EKG today reveals normal sinus rhythm right bundle branch block first-degree AV block right axis deviation (the right bundle branch block is old)

## 2024-08-05 NOTE — PACU DISCHARGE NOTE - NSPTMEETSDISCHCRITERIADT_GEN_A_CORE
-2.1 cm heterogeneous hypodensity right lobe of the liver new compared to prior. Both abscess and neoplasm need to considered.   -Triple phase CT ordered    27-Aug-2019 10:15

## 2025-02-19 NOTE — PHYSICAL THERAPY INITIAL EVALUATION ADULT - REHAB POTENTIAL, PT EVAL
Pt and  verbalize discharge instructions. Pt leaves facility with . Pt's port was de-accessed prior to pt discharge.    good, to achieve stated therapy goals

## 2025-04-18 NOTE — ED PROVIDER NOTE - MUSCULOSKELETAL [-], MLM
PAST MEDICAL HISTORY:  CAD (coronary artery disease)     GERD (gastroesophageal reflux disease)     H/O osteoporosis     History of osteoarthritis     Hyperlipidemia     Hypertension     Vitiligo body    
no neck pain

## 2025-04-18 NOTE — ED ADULT NURSE NOTE - OBJECTIVE STATEMENT
89y male coming in from MD office c/o rib pain. A&Ox3 and VSS. Hx of HTN, hypothyroidism and BPH. Pt presemts to ED after falling while at Cardiologist office. Pt reports falling onto left side hitting exam table. Presents to ED c/o loeft rib pain and LUQ pain. Pain 0/10 at rest and 10/10 upon palpation. Denies chest pain and sob. No obvious deformities noted. Denies hitting head, denies LOC.
The patient is a 20y Male complaining of dizziness.